# Patient Record
Sex: MALE | Race: WHITE | URBAN - METROPOLITAN AREA
[De-identification: names, ages, dates, MRNs, and addresses within clinical notes are randomized per-mention and may not be internally consistent; named-entity substitution may affect disease eponyms.]

---

## 2021-11-02 ENCOUNTER — OUTPATIENT (OUTPATIENT)
Dept: OUTPATIENT SERVICES | Facility: HOSPITAL | Age: 81
LOS: 1 days | Discharge: HOME | End: 2021-11-02
Payer: MEDICARE

## 2021-11-02 VITALS — WEIGHT: 175.05 LBS | HEIGHT: 67 IN

## 2021-11-02 DIAGNOSIS — Z98.890 OTHER SPECIFIED POSTPROCEDURAL STATES: Chronic | ICD-10-CM

## 2021-11-02 DIAGNOSIS — Z95.0 PRESENCE OF CARDIAC PACEMAKER: Chronic | ICD-10-CM

## 2021-11-02 DIAGNOSIS — I42.9 CARDIOMYOPATHY, UNSPECIFIED: ICD-10-CM

## 2021-11-02 DIAGNOSIS — Z90.49 ACQUIRED ABSENCE OF OTHER SPECIFIED PARTS OF DIGESTIVE TRACT: Chronic | ICD-10-CM

## 2021-11-02 LAB
GLUCOSE BLDC GLUCOMTR-MCNC: 144 MG/DL — HIGH (ref 70–99)
GLUCOSE BLDC GLUCOMTR-MCNC: 99 MG/DL — SIGNIFICANT CHANGE UP (ref 70–99)

## 2021-11-02 RX ORDER — DAPAGLIFLOZIN 10 MG/1
1 TABLET, FILM COATED ORAL
Qty: 0 | Refills: 0 | DISCHARGE

## 2021-11-02 RX ORDER — SODIUM CHLORIDE 9 MG/ML
1000 INJECTION, SOLUTION INTRAVENOUS
Refills: 0 | Status: DISCONTINUED | OUTPATIENT
Start: 2021-11-02 | End: 2021-11-03

## 2021-11-02 RX ORDER — DEXTROSE 50 % IN WATER 50 %
12.5 SYRINGE (ML) INTRAVENOUS ONCE
Refills: 0 | Status: DISCONTINUED | OUTPATIENT
Start: 2021-11-02 | End: 2021-11-03

## 2021-11-02 RX ORDER — DEXTROSE 50 % IN WATER 50 %
25 SYRINGE (ML) INTRAVENOUS ONCE
Refills: 0 | Status: DISCONTINUED | OUTPATIENT
Start: 2021-11-02 | End: 2021-11-03

## 2021-11-02 RX ORDER — GLUCAGON INJECTION, SOLUTION 0.5 MG/.1ML
1 INJECTION, SOLUTION SUBCUTANEOUS ONCE
Refills: 0 | Status: DISCONTINUED | OUTPATIENT
Start: 2021-11-02 | End: 2021-11-03

## 2021-11-02 RX ORDER — CARVEDILOL PHOSPHATE 80 MG/1
12.5 CAPSULE, EXTENDED RELEASE ORAL
Refills: 0 | Status: DISCONTINUED | OUTPATIENT
Start: 2021-11-02 | End: 2021-11-03

## 2021-11-02 RX ORDER — CEFAZOLIN SODIUM 1 G
VIAL (EA) INJECTION
Refills: 0 | Status: COMPLETED | OUTPATIENT
Start: 2021-11-02 | End: 2021-11-03

## 2021-11-02 RX ORDER — CEFAZOLIN SODIUM 1 G
2000 VIAL (EA) INJECTION ONCE
Refills: 0 | Status: COMPLETED | OUTPATIENT
Start: 2021-11-02 | End: 2021-11-02

## 2021-11-02 RX ORDER — DEXTROSE 50 % IN WATER 50 %
15 SYRINGE (ML) INTRAVENOUS ONCE
Refills: 0 | Status: DISCONTINUED | OUTPATIENT
Start: 2021-11-02 | End: 2021-11-03

## 2021-11-02 RX ORDER — SACUBITRIL AND VALSARTAN 24; 26 MG/1; MG/1
1 TABLET, FILM COATED ORAL
Refills: 0 | Status: DISCONTINUED | OUTPATIENT
Start: 2021-11-02 | End: 2021-11-03

## 2021-11-02 RX ORDER — CEFAZOLIN SODIUM 1 G
2000 VIAL (EA) INJECTION EVERY 8 HOURS
Refills: 0 | Status: COMPLETED | OUTPATIENT
Start: 2021-11-02 | End: 2021-11-03

## 2021-11-02 RX ORDER — INSULIN LISPRO 100/ML
VIAL (ML) SUBCUTANEOUS
Refills: 0 | Status: DISCONTINUED | OUTPATIENT
Start: 2021-11-02 | End: 2021-11-03

## 2021-11-02 RX ORDER — SACUBITRIL AND VALSARTAN 24; 26 MG/1; MG/1
1 TABLET, FILM COATED ORAL
Qty: 0 | Refills: 0 | DISCHARGE

## 2021-11-02 RX ORDER — CARVEDILOL PHOSPHATE 80 MG/1
1 CAPSULE, EXTENDED RELEASE ORAL
Qty: 0 | Refills: 0 | DISCHARGE

## 2021-11-02 RX ORDER — TAMSULOSIN HYDROCHLORIDE 0.4 MG/1
1 CAPSULE ORAL
Qty: 0 | Refills: 0 | DISCHARGE

## 2021-11-02 RX ORDER — ETANERCEPT 25 MG
0 VIAL (EA) SUBCUTANEOUS
Qty: 0 | Refills: 0 | DISCHARGE

## 2021-11-02 RX ORDER — APIXABAN 2.5 MG/1
1 TABLET, FILM COATED ORAL
Qty: 0 | Refills: 0 | DISCHARGE

## 2021-11-02 RX ORDER — INFLUENZA VIRUS VACCINE 15; 15; 15; 15 UG/.5ML; UG/.5ML; UG/.5ML; UG/.5ML
0.7 SUSPENSION INTRAMUSCULAR ONCE
Refills: 0 | Status: COMPLETED | OUTPATIENT
Start: 2021-11-02 | End: 2021-11-02

## 2021-11-02 RX ORDER — TAMSULOSIN HYDROCHLORIDE 0.4 MG/1
0.4 CAPSULE ORAL DAILY
Refills: 0 | Status: DISCONTINUED | OUTPATIENT
Start: 2021-11-02 | End: 2021-11-03

## 2021-11-02 RX ORDER — INSULIN LISPRO 100/ML
VIAL (ML) SUBCUTANEOUS ONCE
Refills: 0 | Status: COMPLETED | OUTPATIENT
Start: 2021-11-02 | End: 2021-11-02

## 2021-11-02 RX ADMIN — TAMSULOSIN HYDROCHLORIDE 0.4 MILLIGRAM(S): 0.4 CAPSULE ORAL at 23:01

## 2021-11-02 RX ADMIN — Medication 100 MILLIGRAM(S): at 14:48

## 2021-11-02 RX ADMIN — Medication 100 MILLIGRAM(S): at 23:01

## 2021-11-02 RX ADMIN — CARVEDILOL PHOSPHATE 12.5 MILLIGRAM(S): 80 CAPSULE, EXTENDED RELEASE ORAL at 19:24

## 2021-11-02 RX ADMIN — SACUBITRIL AND VALSARTAN 1 TABLET(S): 24; 26 TABLET, FILM COATED ORAL at 21:42

## 2021-11-02 NOTE — H&P ADULT - NSICDXPASTMEDICALHX_GEN_ALL_CORE_FT
PAST MEDICAL HISTORY:  Diabetes mellitus     History of BPH     History of complete heart block     Rheumatoid arthritis

## 2021-11-02 NOTE — PATIENT PROFILE ADULT - PRO INTERPRETER NEED 2
Health Maintenance Summary     Topic Due On Due Status Completed On    MAMMOGRAM - BREAST CANCER SCREENING Jan 18, 2018 Not Due Jan 18, 2016    Pap Smear - Cervical Cancer Screening  Mar 1, 2021 Not Due Mar 1, 2016    Colorectal Cancer Screening - Colonoscopy Oct 29, 2020 Not Due Oct 29, 2015    Immunization - Td/Tdap Nov 7, 2023 Not Due Nov 7, 2013    Immunization-Zoster  Completed Nov 7, 2013    Immunization - TDAP Pregnancy  Hidden     Medicare Wellness Visit Nov 15, 2017 Hidden     Immunization-Influenza  Completed Oct 4, 2016          Patient is due for topics as listed above, she wishes to decline at this time .       English

## 2021-11-02 NOTE — H&P ADULT - NSICDXPASTSURGICALHX_GEN_ALL_CORE_FT
PAST SURGICAL HISTORY:  Cardiac pacemaker     History of appendectomy     History of knee surgery

## 2021-11-02 NOTE — CHART NOTE - NSCHARTNOTEFT_GEN_A_CORE
PRAMOD Teller  [id number]    PROCEDURE:  implant of an ICD lead  explant of RV pacing lead  re-positioning of RA lead  upgrade to Bi-V ICD      INDICATION:  CRT-P with worsening LV function  AV block        ELECTROPHYSIOLOGIST(S):  Dr. Camilo  Fellow Dr. Egan      ANESTHESIOLOGY:  MAC, local      FINDINGS:  - US guided axillary puncture  - RV ICD implanted in the low RV septum, old RV pacing lead was explanted with no difficulty, more slack added to the RA lead   - leads connected to CRT-D generator and wrapped in Tyrx pouch      COMPLICATIONS:  none      RECOMMENDATIONS:  CXR PA/Lat, 12 lead ECG and device interrogation in AM

## 2021-11-02 NOTE — H&P ADULT - HISTORY OF PRESENT ILLNESS
80 yo M with history of DM, rheumatoid arthritis, CHB, non obstructive CAD, s/p CRT-P (3/29/21)  80 yo M with history of DM, rheumatoid arthritis, CHB, non obstructive CAD, s/p CRT-P (3/29/21) presents for up grade to BiV ICD and RV lead removal.  During his device follow up was noted that his EF reduced  to 35%, despite medical therapy and good  CRT pacing, episodes of NSVT were also noted on device interrogation  patient was offered upgrade to  BIV ICD for primary prevention of SCD.

## 2021-11-02 NOTE — H&P ADULT - ASSESSMENT
80 yo M with history of DM, rheumatoid arthritis, CHB, non obstructive CAD, s/p CRT-P (3/29/21) presents for up grade to BiV ICD and RV lead removal.

## 2021-11-02 NOTE — H&P ADULT - PROBLEM SELECTOR PLAN 1
upgrade to BiV ICD, RV lead revision  hold Eliquis today  bed rest  CXR in am, tylenol PRN   Will continue Entresto /Carvedilol.

## 2021-11-03 ENCOUNTER — TRANSCRIPTION ENCOUNTER (OUTPATIENT)
Age: 81
End: 2021-11-03

## 2021-11-03 ENCOUNTER — RESULT REVIEW (OUTPATIENT)
Age: 81
End: 2021-11-03

## 2021-11-03 VITALS — RESPIRATION RATE: 19 BRPM | DIASTOLIC BLOOD PRESSURE: 67 MMHG | SYSTOLIC BLOOD PRESSURE: 109 MMHG

## 2021-11-03 LAB
A1C WITH ESTIMATED AVERAGE GLUCOSE RESULT: 6.4 % — HIGH (ref 4–5.6)
ESTIMATED AVERAGE GLUCOSE: 137 MG/DL — HIGH (ref 68–114)
GLUCOSE BLDC GLUCOMTR-MCNC: 117 MG/DL — HIGH (ref 70–99)
GLUCOSE BLDC GLUCOMTR-MCNC: 198 MG/DL — HIGH (ref 70–99)

## 2021-11-03 PROCEDURE — 33234 REMOVAL OF PACEMAKER SYSTEM: CPT

## 2021-11-03 PROCEDURE — C1894: CPT

## 2021-11-03 PROCEDURE — C1889: CPT

## 2021-11-03 PROCEDURE — 33249 INSJ/RPLCMT DEFIB W/LEAD(S): CPT

## 2021-11-03 PROCEDURE — 71046 X-RAY EXAM CHEST 2 VIEWS: CPT

## 2021-11-03 PROCEDURE — C1882: CPT

## 2021-11-03 PROCEDURE — C1777: CPT

## 2021-11-03 PROCEDURE — 82962 GLUCOSE BLOOD TEST: CPT

## 2021-11-03 PROCEDURE — 71046 X-RAY EXAM CHEST 2 VIEWS: CPT | Mod: 26

## 2021-11-03 PROCEDURE — 83036 HEMOGLOBIN GLYCOSYLATED A1C: CPT

## 2021-11-03 PROCEDURE — 36415 COLL VENOUS BLD VENIPUNCTURE: CPT

## 2021-11-03 PROCEDURE — 33233 REMOVAL OF PM GENERATOR: CPT

## 2021-11-03 RX ADMIN — SACUBITRIL AND VALSARTAN 1 TABLET(S): 24; 26 TABLET, FILM COATED ORAL at 05:48

## 2021-11-03 RX ADMIN — CARVEDILOL PHOSPHATE 12.5 MILLIGRAM(S): 80 CAPSULE, EXTENDED RELEASE ORAL at 10:46

## 2021-11-03 RX ADMIN — Medication 100 MILLIGRAM(S): at 05:49

## 2021-11-03 NOTE — DISCHARGE NOTE PROVIDER - NSDCCPCAREPLAN_GEN_ALL_CORE_FT
PRINCIPAL DISCHARGE DIAGNOSIS  Diagnosis: Cardiomyopathy, primary  Assessment and Plan of Treatment:       SECONDARY DISCHARGE DIAGNOSES  Diagnosis: History of complete heart block  Assessment and Plan of Treatment:

## 2021-11-03 NOTE — DISCHARGE NOTE NURSING/CASE MANAGEMENT/SOCIAL WORK - NSDCFUADDAPPT_GEN_ALL_CORE_FT
Follow up Dr. Camilo , call his office at [phone] to schedule an appointment or if you have questions.   Avoid heavy lifting, exercise and strenuous activity for 4 weeks. Showering is ok.   Do not raise your left arm over the shoulder.   Resume Eliquis 11/4/ 21 in am.

## 2021-11-03 NOTE — DISCHARGE NOTE NURSING/CASE MANAGEMENT/SOCIAL WORK - PATIENT PORTAL LINK FT
You can access the FollowMyHealth Patient Portal offered by [facility] by registering at the following website: [email or web address]. By joining [facility]’s FollowMyHealth portal, you will also be able to view your health information using other applications (apps) compatible with our system.

## 2021-11-03 NOTE — DISCHARGE NOTE PROVIDER - CARE PROVIDER_API CALL
John Camilo (MD)  Cardiac Electrophysiology; Cardiology; Internal Medicine  [address] [place], [place] [address]  Phone: [phone]  Fax: [phone]  Follow Up Time:

## 2021-11-03 NOTE — DISCHARGE NOTE PROVIDER - NSDCCPTREATMENT_GEN_ALL_CORE_FT
PRINCIPAL PROCEDURE  Procedure: Upgrade, cardiac pacemaker to ICD, with right ventricular electrode lead removal  Findings and Treatment:

## 2021-11-03 NOTE — DISCHARGE NOTE PROVIDER - HOSPITAL COURSE
82 yo M with history of DM, rheumatoid arthritis, CHB, non obstructive CAD, s/p CRT-P (3/29/21) presents for up grade to BiV ICD and RV lead removal.  Patient had successful up grade to BiV ICD and removal of RV pacing lead 0n 11/2/21.  There were no complications, pos implant ICD check , normal function. 80 yo M with history of DM, rheumatoid arthritis, CHB, non obstructive CAD, s/p CRT-P (3/29/21) presents for up grade to BiV ICD and RV lead removal.  Patient had successful up grade to BiV ICD and removal of RV pacing lead 0n 11/2/21.  There were no complications, pos implant ICD check , normal function.   CXR done leads are in good position  Patient was stable for discharge

## 2021-11-03 NOTE — PROGRESS NOTE ADULT - SUBJECTIVE AND OBJECTIVE BOX
EPS Device interrogation    Indication: post implant    Device model: 	MDT Cobalt Quad 		Implanting Physician: Dr. Camilo     Functioning Mode: DDDR 60/130			    Underlying Rhythm: AP/    Pacemaker dependency:  yes     Battery status: 100% (NOHEMY)  Interrogating parameters:   				RA			RV			LV    Sense:                                      0.8   mV                          paced    Threshold:                            0.75 V @ 0.4 ms          0.5 V @ 0.4                   0.5 V @ 0.4 ms    Pacing Impedance:                        399 om                         380 om                   703 om    Shock Impedance:                                                              44   om    Events/Alert:  none    Parameter change: 	none    For ICD only:  tachy therapy – unchanged   Normal function ICD  implant site no bleeding, no swelling, no hematoma  [ ]EPS attending: Interrogation reviewed. Agree with above.

## 2021-11-03 NOTE — DISCHARGE NOTE PROVIDER - NSDCMRMEDTOKEN_GEN_ALL_CORE_FT
carvedilol 12.5 mg oral tablet: 1 tab(s) orally 2 times a day  Eliquis 5 mg oral tablet: 1 tab(s) orally 2 times a day  Enbrel 25 mg/0.5 mL subcutaneous solution: subcutaneous 2 times a week  Entresto 24 mg-26 mg oral tablet: 1 tab(s) orally 2 times a day  Farxiga 5 mg oral tablet: 1 tab(s) orally once a day  tamsulosin 0.4 mg oral capsule: 1 cap(s) orally once a day

## 2021-11-03 NOTE — DISCHARGE NOTE PROVIDER - NSDCFUADDAPPT_GEN_ALL_CORE_FT
Follow up Dr. Camilo , call his office at [phone] to schedule an appointment or if you have questions.   Avoid heavy lifting, exercise and strenuous activity for 4 weeks. Showering is ok.   Do not raise your left arm over the shoulder.   Resume Eliquis 11/4/ 21 in am.

## 2021-11-04 PROBLEM — Z00.00 ENCOUNTER FOR PREVENTIVE HEALTH EXAMINATION: Status: ACTIVE | Noted: 2021-11-04

## 2021-12-13 PROBLEM — Z87.438 PERSONAL HISTORY OF OTHER DISEASES OF MALE GENITAL ORGANS: Chronic | Status: ACTIVE | Noted: 2021-11-02

## 2021-12-13 PROBLEM — Z86.79 PERSONAL HISTORY OF OTHER DISEASES OF THE CIRCULATORY SYSTEM: Chronic | Status: ACTIVE | Noted: 2021-11-02

## 2021-12-13 PROBLEM — E11.9 TYPE 2 DIABETES MELLITUS WITHOUT COMPLICATIONS: Chronic | Status: ACTIVE | Noted: 2021-11-02

## 2021-12-13 PROBLEM — M06.9 RHEUMATOID ARTHRITIS, UNSPECIFIED: Chronic | Status: ACTIVE | Noted: 2021-11-02

## 2021-12-23 ENCOUNTER — APPOINTMENT (OUTPATIENT)
Dept: HEART AND VASCULAR | Facility: CLINIC | Age: 81
End: 2021-12-23
Payer: MEDICARE

## 2021-12-23 ENCOUNTER — APPOINTMENT (OUTPATIENT)
Dept: CARDIOLOGY | Facility: CLINIC | Age: 81
End: 2021-12-23
Payer: MEDICARE

## 2021-12-23 ENCOUNTER — NON-APPOINTMENT (OUTPATIENT)
Age: 81
End: 2021-12-23

## 2021-12-23 VITALS
HEIGHT: 67 IN | OXYGEN SATURATION: 97 % | DIASTOLIC BLOOD PRESSURE: 67 MMHG | SYSTOLIC BLOOD PRESSURE: 116 MMHG | BODY MASS INDEX: 28.16 KG/M2 | WEIGHT: 179.38 LBS | TEMPERATURE: 97.7 F | HEART RATE: 87 BPM

## 2021-12-23 DIAGNOSIS — Z87.891 PERSONAL HISTORY OF NICOTINE DEPENDENCE: ICD-10-CM

## 2021-12-23 DIAGNOSIS — Z78.9 OTHER SPECIFIED HEALTH STATUS: ICD-10-CM

## 2021-12-23 PROCEDURE — 93000 ELECTROCARDIOGRAM COMPLETE: CPT

## 2021-12-23 PROCEDURE — 99205 OFFICE O/P NEW HI 60 MIN: CPT | Mod: 25

## 2021-12-23 PROCEDURE — 99215 OFFICE O/P EST HI 40 MIN: CPT

## 2021-12-23 PROCEDURE — 93306 TTE W/DOPPLER COMPLETE: CPT | Mod: 59

## 2021-12-23 RX ORDER — TAMSULOSIN HYDROCHLORIDE 0.4 MG/1
0.4 CAPSULE ORAL
Qty: 90 | Refills: 3 | Status: ACTIVE | COMMUNITY

## 2021-12-23 NOTE — HISTORY OF PRESENT ILLNESS
[FreeTextEntry1] : Mr. Broussadr presents for initial evaluation and management of chronic systolic heart failure secondary to nonischemic cardiomyopathy, nonobstructive CAD, complete heart block with urgent PPM implantation (03/29/21), s/p upgrade to CRT-D (11/02/21), paroxysmal atrial fibrillation, aTTR-cardiac amyloidosis, DM2, and "rheumatoid arthritis".  He is followed by a cardiologist at [facility], Edison Edmonds MD in [place].  He is referred for the management of cardiac amyloidosis.  He is being treated for chronic systolic heart failure secondary to nonischemic cardiomyopathy.  Based on an echocardiogram, his cardiologist sent him for a cardiac MR to rule out an infiltrative cardiomyopathy.  The cardiac MR was consistent with an infiltrative cardiomyopathy (likely amyloidosis).  He had a normal SPEP and UPEP (HARPREET not performed) and was then sent of a TC-99m PYP scan which was markedly positive for ATTR-cardiac amyloidosis.  In March, 2021, while on vacation in [place], [place], he had progression of cardiac conduction disease and underwent urgent implantation of a dual chamber pacemaker with Jose Alejandro Alvarado MD.  His device interrogations revealed paroxysmal atrial fibrillation and ventricular ectopy.  On 11/02/21, he underwent upgrade to a CRT-D device with John Camilo MD of [facility] .  We had an extensive discussion about the pathophysiology, natural progression, and treatment of ATTR-cardiac amyloidosis.  At present, he has complaints of LEON to 2 flights of stairs.  He denies chest pain, palpitations, or lower extremity syncope.  In addition, he has complaints of polyarthralgia and was diagnosed with rheumatoid arthritis but we discussed the possibility of amyloid synovial arthropathy.  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  Today, 12/23/21, he had an echocardiogram which revealed an EF of 33%, reduced LV GLS -14.2% (a/b+m 1.7), mild symmetrically increased LV wall thickness, grade II diastolic dysfunction, mildly reduced RV function, mildly dilated LA, aortic sclerosis, mild AI, mild TR, and CRT leads.  \par

## 2021-12-23 NOTE — REVIEW OF SYSTEMS
[Dyspnea on exertion] : dyspnea during exertion [Joint Pain] : joint pain [Tingling (Paresthesia)] : tingling [Negative] : Heme/Lymph

## 2021-12-23 NOTE — ASSESSMENT
[FreeTextEntry1] : 1. Complete heart block: likely secondary to cardiac amyloidosis: s/p urgent PPM implantation (03/29/21), s/p upgrade to CRT-D (11/02/21):\par      - follow up with heart rhythm specialist, John Camilo MD and device clinic at [facility] in [place]\par \par 2. Paroxysmal atrial fibrillation: likely secondary to cardiac amyloidosis:\par      - continue systemic anticoagulation with Eliquis 5mg po bid\par      - discussed with patient avoidance of ASA and NSAIDS as well as need for bleeding surveillance \par      - patient will provide copy of recent lab work from PMD's office for my review \par \par 3. Chronic systolic heart failure, secondary to nonischemic cardiomyopathy, cardiac amyloidosis: NYHA II\par      - continue Entresto 24/26mg po bid (up titrate as SBP allows)\par      - continue carvedilol 12.5mg po bid (up titrate as SBP allows) \par      - continue eplerenone 25mg po qd (had mastalgia on spironolactone)\par      - continue SGLT2 inhibition with Farxiga 5mg po qd (discussed proper urinary hygiene)\par      - continue furosemide 40mg po qd prn edema \par      - will try to obtain recent echocardiogram report\par      - will send for an echocardiogram with LV GLS\par      - standard HFrEF therapy often not well tolerated with cardiac amyloidosis but he is doing well\par \par 4. CAD: nonobstructive: CCS 0\par      - will try to obtain prior invasive coronary angiogram report\par      - continue off antiplatelet agent given need for systemic anticoagulation\par  \par 5. Cardiac amyloidosis: ATTR\par     - follow up results of genetic work up performed today\par     - will initiate a tetramer stabilizing agent, Vyndamax 61mg po qd  (I reviewed any possible related COI) \par     - will send for an echocardiogram to assess LV EF and global longitudinal strain\par     - will try to obtain reports from work up (cardiac MR, echo, PYP scan, lab work)\par     - will consider adding other agents when approved for cardiac amyloidosis\par \par 6. Polyneuropathy: hands:\par     - will send to a neurologist, Nathen Mehta MD to screen for amyloid polyneuropathy\par \par \par \par An ECG was obtained to evaluate heart rhythm and screen for any underlying cardiac abnormalities. \par \par \par

## 2021-12-27 ENCOUNTER — NON-APPOINTMENT (OUTPATIENT)
Age: 81
End: 2021-12-27

## 2022-01-18 LAB — COMBINED CARDIAC PANEL: ABNORMAL

## 2022-02-21 PROBLEM — I00 RHEUMATIC ARTERITIS: Status: ACTIVE | Noted: 2021-12-23

## 2022-02-24 ENCOUNTER — APPOINTMENT (OUTPATIENT)
Dept: HEART AND VASCULAR | Facility: CLINIC | Age: 82
End: 2022-02-24
Payer: MEDICARE

## 2022-02-24 VITALS — BODY MASS INDEX: 27.94 KG/M2 | HEIGHT: 67 IN | WEIGHT: 178 LBS

## 2022-02-24 DIAGNOSIS — I00 RHEUMATIC FEVER W/OUT HEART INVOLVEMENT: ICD-10-CM

## 2022-02-24 PROCEDURE — 99214 OFFICE O/P EST MOD 30 MIN: CPT | Mod: 95

## 2022-02-24 NOTE — ASSESSMENT
[FreeTextEntry1] : 1. Complete heart block: likely secondary to cardiac amyloidosis: s/p urgent PPM implantation (03/29/21), s/p upgrade to CRT-D (11/02/21):\par      - follow up with heart rhythm specialist, John Camilo MD and device clinic at [facility] in [place]\par \par 2. Paroxysmal atrial fibrillation: likely secondary to cardiac amyloidosis:\par      - continue systemic anticoagulation with Eliquis 5mg po bid\par      - discussed with patient avoidance of ASA and NSAIDS as well as need for bleeding surveillance \par \par 3. Chronic systolic heart failure, secondary to nonischemic cardiomyopathy, cardiac amyloidosis: NYHA II\par      - continue Entresto 24/26mg po bid (up titrate as SBP allows)\par      - continue carvedilol 12.5mg po bid (up titrate as SBP allows) \par      - continue eplerenone 25mg po qd (had mastalgia on spironolactone)\par      - continue SGLT2 inhibition with Farxiga 5mg po qd (discussed proper urinary hygiene)\par      - continue furosemide 40mg po qd prn edema \par      - standard HFrEF therapy often not well tolerated with cardiac amyloidosis but he is doing well\par \par 4. CAD: nonobstructive: CCS 0\par      - will try to obtain prior invasive coronary angiogram report\par      - continue off antiplatelet agent given need for systemic anticoagulation\par  \par 5. Cardiac amyloidosis: ATTR-CM\par     - follow up results of genetic work up (still pending)\par     - continue tetramer stabilizing agent, Vyndamax 61mg po qd   \par     - will consider adding other agents when approved for cardiac amyloidosis or if diagnosed with amyloid neuropathy\par \par 6. Polyneuropathy: hands:\par     - will send to a neurologist, Nathen Mehta MD to screen for amyloid polyneuropathy (has an appointment in May, 2022) \par     - he will provide a copy of the EMG report performed by his primary neurologist \par \par This encounter was performed as a telehealth encounter employing the [employer], [employer], or other approved audio/video platform.  All components of the evaluation and management were performed per clinical routine with the exception of the physical exam.  The physical exam references my most recent physical exam plus any additional information provided by the patient (i.e. ambulatory vitals/weight) or inspection from the video portion of the encounter. \par \par I spent in excess of 40 minutes on the encounter.  \par \par Verbal consent was given on 02/24/22 by Adilson Broussard.\par \par Patient location: The patient was located at the primary address listed in the medical record.\par Physician location: I was located in my office in [facility]. \par \par \par \par \par \par

## 2022-02-24 NOTE — REASON FOR VISIT
[FreeTextEntry1] : Diagnostic Tests:\par ------------------------------------------\par ECG:\par 12/23/21: A-paced, Bi-V paced. \par ------------------------------------------\par Echo:\par 12/23/21: EF 33%, reduced LV GLS -14.2% (a/b+m 1.7), mild symmetrically increased LV wall thickness, grade II diastolic dysfunction, mildly reduced RV function, mildly dilated LA, aortic sclerosis, mild AI, mild TR, CRT leads.  \par 08/03/21: EF 35%, moderately increased LV wall thickness (1.7/1.6), grade II diastolic dysfunction, LV GLS -11% with relative apical sparring, mildly dilated RV, reduced RV function, increase RV wall thickness, markedly dilated LA, moderately dilated RA, mild MR, aortic root 3.7 cm, device leads.  \par ------------------------------------------\par EP procedures:\par 11/02/21: upgrade to CRT-D. \par 03/29/21: CRT-P placement: [employer]. \par ------------------------------------------\par Nuclear Med:\par 12/07/21: TC-99 PYP scan: grade 3/3 myocardial uptake.

## 2022-02-24 NOTE — HISTORY OF PRESENT ILLNESS
[FreeTextEntry1] : Mr. Broussard presents for follow up and management of chronic systolic heart failure secondary to nonischemic cardiomyopathy, nonobstructive CAD, complete heart block with urgent PPM implantation (03/29/21), s/p upgrade to CRT-D (11/02/21), paroxysmal atrial fibrillation, aTTR-cardiac amyloidosis, DM2, and "rheumatoid arthritis".  He is followed by a cardiologist at [facility], Edison Edmonds MD in [place].  He is referred for the management of cardiac amyloidosis.  He is being treated for chronic systolic heart failure secondary to nonischemic cardiomyopathy.  Based on an echocardiogram, his cardiologist sent him for a cardiac MR to rule out an infiltrative cardiomyopathy.  The cardiac MR was consistent with an infiltrative cardiomyopathy (likely amyloidosis).  He had a normal SPEP and UPEP (HARPREET not performed) and was then sent of a TC-99m PYP scan which was markedly positive for ATTR-cardiac amyloidosis.  In March, 2021, while on vacation in [place], [place], he had progression of cardiac conduction disease and underwent urgent implantation of a dual chamber pacemaker with Jose Alejandro Alvarado MD.  His device interrogations revealed paroxysmal atrial fibrillation and ventricular ectopy.  On 11/02/21, he underwent upgrade to a CRT-D device with John Camilo MD of [facility] .  We had an extensive discussion about the pathophysiology, natural progression, and treatment of ATTR-cardiac amyloidosis.  At present, he has complaints of LEON to 2 flights of stairs.  He denies chest pain, palpitations, or lower extremity syncope.  In addition, he has complaints of polyarthralgia and was diagnosed with rheumatoid arthritis but we discussed the possibility of amyloid synovial arthropathy.  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  On 12/23/21, he had an echocardiogram which revealed an EF of 33%, reduced LV GLS -14.2% (a/b+m 1.7), mild symmetrically increased LV wall thickness, grade II diastolic dysfunction, mildly reduced RV function, mildly dilated LA, aortic sclerosis, mild AI, mild TR, and CRT leads.  At present, he feels generally well. \par

## 2022-05-05 ENCOUNTER — APPOINTMENT (OUTPATIENT)
Dept: CARDIOLOGY | Facility: CLINIC | Age: 82
End: 2022-05-05
Payer: MEDICARE

## 2022-05-05 ENCOUNTER — NON-APPOINTMENT (OUTPATIENT)
Age: 82
End: 2022-05-05

## 2022-05-05 VITALS
DIASTOLIC BLOOD PRESSURE: 74 MMHG | SYSTOLIC BLOOD PRESSURE: 120 MMHG | WEIGHT: 184 LBS | TEMPERATURE: 97.3 F | BODY MASS INDEX: 28.88 KG/M2 | OXYGEN SATURATION: 97 % | HEIGHT: 67 IN | HEART RATE: 85 BPM

## 2022-05-05 PROCEDURE — 99215 OFFICE O/P EST HI 40 MIN: CPT

## 2022-05-05 PROCEDURE — 93000 ELECTROCARDIOGRAM COMPLETE: CPT

## 2022-05-05 NOTE — PHYSICAL EXAM
[Extraocular Movements Intact] : extraocular movements were intact [Normal] : no rash, no stigmata of neurocutaneous disease [Muscle tone/ strength] : muscle tone/ strength is normal [Pectus Deformity] : no pectus deformity [Tremor] : no tremor [de-identified] : 5/5 UE and LE, negative tinnels and sign

## 2022-05-05 NOTE — REVIEW OF SYSTEMS
[Fatigue] : fatigue [Palpitations] : palpitations [Arrhythmia] : arrhythmia [Negative] : Musculoskeletal [Generalized pain] : pain not generalized [Vision loss] : no vision loss [FreeTextEntry7] : early satiety

## 2022-05-05 NOTE — PHYSICAL EXAM
[Extraocular Movements Intact] : extraocular movements were intact [Normal] : no rash, no stigmata of neurocutaneous disease [Muscle tone/ strength] : muscle tone/ strength is normal [Pectus Deformity] : no pectus deformity [Tremor] : no tremor [de-identified] : 5/5 UE and LE, negative tinnels and sign

## 2022-05-05 NOTE — FAMILY HISTORY
[FreeTextEntry1] : FamilyHistory_20_twCiteListControlStart FamilyHistory_20_twCiteListControlEnd [id number] \par A four-generation family history was constructed and scanned into [employer]. \par Family history is significant for: \par siblings\par sister 76 yo  healthy- no children\par sister 72 yo healthy- son 41 yo (son 7yo) , daughter 34 yo healthy \par \par Mother dec br CA mets to lung 71 yo\par Maternal aunts x3 all dec, cousin dec 80 yo hairy cell leukemia  (2sons and 1 daughter in 40s)\par Maternal uncles x1 all dec\par med hx unk\par Maternal Grandmother dec mid 80s\par Maternal Grandfather dec CVA mid 70s, IDDM \par \par Father dec 98 old age, slow heart beat treated with medication (for 20 years), hx polio\par 13 sibling, 11 to adulthood\par one still born and one infant death both M\par Paternal aunts emaining F, all but one aunt dec , \par living aunt 90s\par Paternal uncles x3\par dec 104 yo\par \par Paternal Grandfather dec 99 yo (cousins)\par Paternal Grandmother dec 87 yo\par *was told all of his fathers family had "slow hearts"\par \par children:\par 3 sons\par son 46 yo healthy-son 19 yo , son 15 yo, daughter 11 yo\par son 44 yo  healthy- son 12 yo , son 12 yo healthy\par son 43 yo healthy- no children\par \par his maternal families originate from [facility] and paternal families originate from [place] (Mennonites) \par No Ashkenazi Adventist ancestry. \par Family history was positive Paternal grandparents cousins consanguinity  \par No family history of SIDS\par  \par \par  [FreeTextEntry2] : [place]ip [place] , [place]  [FreeTextEntry3] : [place], [place] ([facility])

## 2022-05-05 NOTE — HISTORY OF PRESENT ILLNESS
[FreeTextEntry1] : PRAMOD YI is a 82 yo M PMH DM, rheumatoid arthritis, CHB, non obstructive CAD, s/p\par ppm (3/29/21) recent upgrade to BiV ICD and RV lead removal., afib event on eliquis (id on his PPM) \par with reduction in his EF despite GDMT\par \par he was first diagnosed with CM 5-6 years ago during medical clearance for knee surgery , \par he was found to have thickened wall of his LV at the time \par \par he states a diagnosis of cardiac amyloid, Dr Edmonds sent him fo rcMRI , \par and another scan (will obtain records) \par \par + numbness fingers began 6 m ago- happens every night bl hands Rt >LT\par reduced appetite, early satiety\par hx cataract\par \par since his last visit he had a Tc PYR scan that was + for TTR CA\par \par he underwent genetic testing and today presents for results\par \par

## 2022-05-05 NOTE — FAMILY HISTORY
[FreeTextEntry1] : FamilyHistory_20_twCiteListControlStart FamilyHistory_20_twCiteListControlEnd [id number] \par A four-generation family history was constructed and scanned into [employer]. \par Family history is significant for: \par siblings\par sister 76 yo  healthy- no children\par sister 72 yo healthy- son 39 yo (son 9yo) , daughter 34 yo healthy \par \par Mother dec br CA mets to lung 73 yo\par Maternal aunts x3 all dec, cousin dec 80 yo hairy cell leukemia  (2sons and 1 daughter in 40s)\par Maternal uncles x1 all dec\par med hx unk\par Maternal Grandmother dec mid 80s\par Maternal Grandfather dec CVA mid 70s, IDDM \par \par Father dec 98 old age, slow heart beat treated with medication (for 20 years), hx polio\par 13 sibling, 11 to adulthood\par one still born and one infant death both M\par Paternal aunts emaining F, all but one aunt dec , \par living aunt 90s\par Paternal uncles x3\par dec 104 yo\par \par Paternal Grandfather dec 97 yo (cousins)\par Paternal Grandmother dec 87 yo\par *was told all of his fathers family had "slow hearts"\par \par children:\par 3 sons\par son 46 yo healthy-son 17 yo , son 15 yo, daughter 13 yo\par son 44 yo  healthy- son 12 yo , son 14 yo healthy\par son 45 yo healthy- no children\par \par his maternal families originate from [facility] and paternal families originate from [place], [place] (Mennonites) \par No Ashkenazi Mosque ancestry. \par Family history was positive Paternal grandparents cousins consanguinity  \par No family history of SIDS\par  \par \par  [FreeTextEntry2] : Sandip Malone , [place]  [FreeTextEntry3] : [place], [place] ([facility])

## 2022-05-05 NOTE — REASON FOR VISIT
[FreeTextEntry3] : Dear Dr. Mcdowell     . I saw your patient PRAMOD YI on 12/23/2021 . Please see the note below for the assessment and plan. \par \par PRAMOD YI  is being seen  for an initial consultation at the [facility] at [facility] on 12/23/2021.   Mr. YI was referred by  Dr mcdowell for hereditary cardiac predisposition risk assessment and counseling, due to NICM possible amyloid \par

## 2022-05-05 NOTE — REASON FOR VISIT
[FreeTextEntry3] : Dear Dr. Mcdowell     . I saw your patient PRAMOD YI on 12/23/2021 . Please see the note below for the assessment and plan. \par \par PRAMOD YI  is being seen  for an initial consultation at the Cardi[facility] Program at [facility] on 12/23/2021.   Mr. YI was referred by  Dr mcdowell for hereditary cardiac predisposition risk assessment and counseling, due to NICM possible amyloid \par

## 2022-05-05 NOTE — HISTORY OF PRESENT ILLNESS
[FreeTextEntry1] : PRAMOD YI is a 82 yo M PMH DM, rheumatoid arthritis, CHB, non obstructive CAD, s/p\par ppm (3/29/21) recent upgrade to BiV ICD and RV lead removal., afib event on eliquis (id on his PPM) \par with reduction in his EF despite GDMT\par \par he was first diagnosed with CM 5-6 years ago during medical clearance for knee surgery , \par he was found to have thickened wall of his LV at the time \par \par he states a diagnosis of cardiac amyloid, Dr Edmonds sent him fo rcMRI , \par and another scan (will obtain records) \par \par + numbness fingers began 6 m ago- happens every night bl hands Rt >LT\par reduced appetite, early satiety\par hx cataract\par \par \par today he is referred for a cardiogenomic evaluation \par

## 2022-06-06 ENCOUNTER — APPOINTMENT (OUTPATIENT)
Dept: NEUROLOGY | Facility: CLINIC | Age: 82
End: 2022-06-06
Payer: MEDICARE

## 2022-06-06 VITALS
TEMPERATURE: 98.2 F | DIASTOLIC BLOOD PRESSURE: 70 MMHG | HEIGHT: 67 IN | OXYGEN SATURATION: 97 % | BODY MASS INDEX: 27.78 KG/M2 | WEIGHT: 177 LBS | SYSTOLIC BLOOD PRESSURE: 125 MMHG | HEART RATE: 85 BPM

## 2022-06-06 DIAGNOSIS — G56.03 CARPAL TUNNEL SYNDROM,BILATERAL UPPER LIMBS: ICD-10-CM

## 2022-06-06 DIAGNOSIS — G62.9 POLYNEUROPATHY, UNSPECIFIED: ICD-10-CM

## 2022-06-06 PROCEDURE — 99204 OFFICE O/P NEW MOD 45 MIN: CPT

## 2022-06-06 NOTE — CONSULT LETTER
[Dear  ___] : Dear  [unfilled], [Consult Letter:] : I had the pleasure of evaluating your patient, [unfilled]. [Please see my note below.] : Please see my note below. [Consult Closing:] : Thank you very much for allowing me to participate in the care of this patient.  If you have any questions, please do not hesitate to contact me. [Sincerely,] : Sincerely, [FreeTextEntry3] : Nathen Mehta M.D.\par Neurology, Electromyography and Neuromuscular Medicine\par [facility]\par \par  of Neurology\par [facility] [employer]

## 2022-06-06 NOTE — HISTORY OF PRESENT ILLNESS
[FreeTextEntry1] : Referred by Dr. Salmeron for numbness and tingling in his hands and feet\par Symptoms started 2 years ago, occurring every night at rest, worse in the feet than the hands \par He had genetic testing for TTR mutations which was negative\par He was diagnosed with wild type cardiac amyloidosis and started on vyndamax about 6 months ago with improvement in the paresthesias \par He denies burning or electrical shooting pain \par He has a history of diabetes mellitus, last A1C in the low 7% range\par \par Reviewed:\par Labs - TSH normal, ESR 44\par Cardiac hereditary mutation panel unremarkable (2 VUS); no mutation in TTR \par NCS/EMG - mild sensorimotor polyneuropathy, moderate b/l median nerve entrapments at the wrists

## 2022-06-06 NOTE — PHYSICAL EXAM
[FreeTextEntry1] : Motor: 5/5 strength throughout \par Sensory: vibration moderately reduced at toes, mildly at ankles b/l\par Reflexes: 2+ UE, 1+ patellar and achilles b/l\par Gait: normal, can walk on heels and toes, can tandem

## 2022-06-06 NOTE — ASSESSMENT
[FreeTextEntry1] : He has evidence of mild polyneuropathy on exam as well as on NCS/EMG, and bilateral carpal tunnel syndrome, although that is not significantly symptomatic\par \par Symptoms are mild and have improved since starting vyndamax. The neuropathy may be due either to diabetes mellitus or wild-type amyloidosis, although this is much less common than cardiac manifestations of wild type TTR disease.And finally, TTR silencer therapy is approved only for hereditary TTR amyloidosis at this time. Given all of these factors, I would not recommend treatment with silencer therapy at this time, and the patient and his wife are in full agreement. \par \par I would recommend increased glycemic control (goal A1C <7.0%) and to start alpha lipoic acid 600mg daily as that has shown promise in improving the symptoms of diabetic polyneuropathy. \par \par He will follow up with his local neurologist and if there is any worsening of symptoms, return here for re-evaluation

## 2022-06-08 ENCOUNTER — APPOINTMENT (OUTPATIENT)
Dept: HEART AND VASCULAR | Facility: CLINIC | Age: 82
End: 2022-06-08
Payer: MEDICARE

## 2022-06-08 VITALS
HEIGHT: 67 IN | WEIGHT: 189 LBS | HEART RATE: 80 BPM | BODY MASS INDEX: 29.66 KG/M2 | TEMPERATURE: 97.1 F | SYSTOLIC BLOOD PRESSURE: 109 MMHG | OXYGEN SATURATION: 97 % | DIASTOLIC BLOOD PRESSURE: 59 MMHG

## 2022-06-08 PROCEDURE — 99214 OFFICE O/P EST MOD 30 MIN: CPT

## 2022-06-08 RX ORDER — ETANERCEPT 25 MG/.5ML
25 SOLUTION SUBCUTANEOUS
Refills: 0 | Status: ACTIVE | COMMUNITY

## 2022-06-08 NOTE — HISTORY OF PRESENT ILLNESS
[FreeTextEntry1] : Mr. Broussard presents for follow up and management of chronic systolic heart failure secondary to nonischemic cardiomyopathy, nonobstructive CAD, complete heart block with urgent PPM implantation (03/29/21), s/p upgrade to CRT-D (11/02/21), paroxysmal atrial fibrillation, cardiac amyloidosis (aTTR-CM-wt), DM2, and "rheumatoid arthritis".  He is referred for the management of cardiac amyloidosis.  He is being treated for chronic systolic heart failure secondary to nonischemic cardiomyopathy.  Based on an echocardiogram, his cardiologist sent him for a cardiac MR to rule out an infiltrative cardiomyopathy.  The cardiac MR was consistent with an infiltrative cardiomyopathy (likely amyloidosis).  He had a normal SPEP and UPEP (HARPREET not performed) and was then sent for a TC-99m PYP scan which was markedly positive for ATTR-cardiac amyloidosis.  In March, 2021, while on vacation in [place], [place], he had progression of cardiac conduction disease and underwent urgent implantation of a dual chamber pacemaker with Jose Alejandro Alvarado MD.  His device interrogations revealed paroxysmal atrial fibrillation and ventricular ectopy.  On 11/02/21, he underwent upgrade to a CRT-D device with John Camilo MD of [facility].  We had an extensive discussion about the pathophysiology, natural progression, and treatment of ATTR-cardiac amyloidosis.  At present, he has complaints of LEON to 2 flights of stairs.  He denies chest pain, palpitations, or lower extremity syncope.  In addition, he has complaints of polyarthralgia and was diagnosed with rheumatoid arthritis but we discussed the possibility of amyloid synovial arthropathy.  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  On 12/23/21, he had an echocardiogram which revealed an EF of 33%, reduced LV GLS -14.2% (a/b+m 1.7), mild symmetrically increased LV wall thickness, grade II diastolic dysfunction, mildly reduced RV function, mildly dilated LA, aortic sclerosis, mild AI, mild TR, and CRT leads.  His cardiologist at [facility] (Edison Edmonds MD) is retiring and he requested a cardiologist within the [facility] system so I referred him to Vik Rojas MD.  \par

## 2022-06-08 NOTE — ASSESSMENT
[FreeTextEntry1] : 1. Complete heart block: likely secondary to cardiac amyloidosis: s/p urgent PPM implantation (03/29/21), s/p upgrade to CRT-D (11/02/21):\par      - follow up with heart rhythm specialist, John Camilo MD and device clinic at [facility] in [place]\par \par 2. Paroxysmal atrial fibrillation: likely secondary to cardiac amyloidosis:\par      - continue systemic anticoagulation with Eliquis 5mg po bid\par      - discussed with patient avoidance of ASA and NSAIDS as well as need for bleeding surveillance \par      - check lab work today \par \par 3. Chronic systolic heart failure, secondary to nonischemic cardiomyopathy, cardiac amyloidosis: NYHA II\par      - continue Entresto 24/26mg po bid (up titrate as SBP allows)\par      - continue carvedilol 12.5mg po bid (up titrate as SBP allows) \par      - continue eplerenone 25mg po qd (had mastalgia on spironolactone)\par      - continue SGLT2 inhibition with Farxiga 5mg po qd (discussed proper urinary hygiene)\par      - continue furosemide 40mg po qd prn edema \par      - standard HFrEF therapy often not well tolerated with cardiac amyloidosis but he is doing well\par \par 4. CAD: nonobstructive: CCS 0\par      - will try to obtain prior invasive coronary angiogram report\par      - continue off antiplatelet agent given need for systemic anticoagulation\par  \par 5. Cardiac amyloidosis: ATTR-CM-wt, 2 genetic variants of undetermined significance (1 autosomal recessive HFE, 1 autosomal dominant ANK2): \par     - follow up with cardiogenomics team to discuss 2 variants of undetermined significance\par     - continue tetramer stabilizing agent, Vyndamax 61mg po qd   \par     - will consider adding other agents when approved for cardiac amyloidosis or if diagnosed with amyloid neuropathy\par     - check lab work today \par     - will follow with serial echocardiograms\par \par Disease Progression Parameters:\par Date……….Prealbumin……….Troponin-I……….NT-proBNP……….LV GLS TTE………. \par 12/23/21............................................................................................-14.2%....................\par \par \par 6. Polyneuropathy: hands: no indication for TTR silencer (patisiran or inotersen) at this time given wild type\par     - follow up with neurologist, Nathen Mehta MD\par \par

## 2022-06-08 NOTE — REASON FOR VISIT
[Other: ____] : [unfilled] [FreeTextEntry1] : Diagnostic Tests:\par ------------------------------------------\par ECG:\par 05/05/22: A-paced, Bi-V paced. \par 12/23/21: A-paced, Bi-V paced. \par ------------------------------------------\par Echo:\par 12/23/21: EF 33%, reduced LV GLS -14.2% (a/b+m 1.7), mild symmetrically increased LV wall thickness, grade II diastolic dysfunction, mildly reduced RV function, mildly dilated LA, aortic sclerosis, mild AI, mild TR, CRT leads.  \par 08/03/21: EF 35%, moderately increased LV wall thickness (1.7/1.6), grade II diastolic dysfunction, LV GLS -11% with relative apical sparring, mildly dilated RV, reduced RV function, increase RV wall thickness, markedly dilated LA, moderately dilated RA, mild MR, aortic root 3.7 cm, device leads.  \par ------------------------------------------\par EP procedures:\par 11/02/21: upgrade to CRT-D. \par 03/29/21: CRT-P placement: Medtronic. \par ------------------------------------------\par Nuclear Med:\par 12/07/21: TC-99 PYP scan: grade 3/3 myocardial uptake.

## 2022-06-09 ENCOUNTER — NON-APPOINTMENT (OUTPATIENT)
Age: 82
End: 2022-06-09

## 2022-06-09 LAB
ALBUMIN SERPL ELPH-MCNC: 4.7 G/DL
ALP BLD-CCNC: 71 U/L
ALT SERPL-CCNC: 14 U/L
ANION GAP SERPL CALC-SCNC: 16 MMOL/L
AST SERPL-CCNC: 23 U/L
BASOPHILS # BLD AUTO: 0.06 K/UL
BASOPHILS NFR BLD AUTO: 1 %
BILIRUB SERPL-MCNC: 0.9 MG/DL
BUN SERPL-MCNC: 61 MG/DL
CALCIUM SERPL-MCNC: 10 MG/DL
CHLORIDE SERPL-SCNC: 102 MMOL/L
CHOLEST SERPL-MCNC: 113 MG/DL
CO2 SERPL-SCNC: 22 MMOL/L
CREAT SERPL-MCNC: 1.65 MG/DL
EGFR: 42 ML/MIN/1.73M2
EOSINOPHIL # BLD AUTO: 0.28 K/UL
EOSINOPHIL NFR BLD AUTO: 4.6 %
ESTIMATED AVERAGE GLUCOSE: 134 MG/DL
GLUCOSE SERPL-MCNC: 125 MG/DL
HBA1C MFR BLD HPLC: 6.3 %
HCT VFR BLD CALC: 42.7 %
HDLC SERPL-MCNC: 65 MG/DL
HGB BLD-MCNC: 14.4 G/DL
IMM GRANULOCYTES NFR BLD AUTO: 0.2 %
LDLC SERPL CALC-MCNC: 38 MG/DL
LDLC SERPL DIRECT ASSAY-MCNC: 37 MG/DL
LYMPHOCYTES # BLD AUTO: 1.58 K/UL
LYMPHOCYTES NFR BLD AUTO: 26 %
MAN DIFF?: NORMAL
MCHC RBC-ENTMCNC: 33.7 GM/DL
MCHC RBC-ENTMCNC: 35.2 PG
MCV RBC AUTO: 104.4 FL
MONOCYTES # BLD AUTO: 0.66 K/UL
MONOCYTES NFR BLD AUTO: 10.9 %
NEUTROPHILS # BLD AUTO: 3.48 K/UL
NEUTROPHILS NFR BLD AUTO: 57.3 %
NONHDLC SERPL-MCNC: 48 MG/DL
NT-PROBNP SERPL-MCNC: 2856 PG/ML
PLATELET # BLD AUTO: 150 K/UL
POTASSIUM SERPL-SCNC: 5.1 MMOL/L
PREALB SERPL NEPH-MCNC: 25 MG/DL
PROT SERPL-MCNC: 7.8 G/DL
RBC # BLD: 4.09 M/UL
RBC # FLD: 13.2 %
SODIUM SERPL-SCNC: 140 MMOL/L
TRIGL SERPL-MCNC: 49 MG/DL
TSH SERPL-ACNC: 2.25 UIU/ML
WBC # FLD AUTO: 6.07 K/UL

## 2022-06-10 ENCOUNTER — RESULT REVIEW (OUTPATIENT)
Age: 82
End: 2022-06-10

## 2022-06-10 ENCOUNTER — APPOINTMENT (OUTPATIENT)
Dept: NEPHROLOGY | Facility: CLINIC | Age: 82
End: 2022-06-10
Payer: MEDICARE

## 2022-06-10 VITALS
WEIGHT: 182.38 LBS | BODY MASS INDEX: 28.63 KG/M2 | SYSTOLIC BLOOD PRESSURE: 110 MMHG | DIASTOLIC BLOOD PRESSURE: 60 MMHG | OXYGEN SATURATION: 99 % | HEIGHT: 67 IN | HEART RATE: 79 BPM | TEMPERATURE: 96.5 F

## 2022-06-10 PROCEDURE — 99204 OFFICE O/P NEW MOD 45 MIN: CPT | Mod: 25

## 2022-06-10 PROCEDURE — 36415 COLL VENOUS BLD VENIPUNCTURE: CPT

## 2022-06-10 NOTE — PHYSICAL EXAM
[General Appearance - Alert] : alert [General Appearance - In No Acute Distress] : in no acute distress [General Appearance - Well Nourished] : well nourished [General Appearance - Well Developed] : well developed [Extraocular Movements] : extraocular movements were intact [Jugular Venous Distention Increased] : there was no jugular-venous distention [] : no respiratory distress [Respiration, Rhythm And Depth] : normal respiratory rhythm and effort [Exaggerated Use Of Accessory Muscles For Inspiration] : no accessory muscle use [Auscultation Breath Sounds / Voice Sounds] : lungs were clear to auscultation bilaterally [Heart Rate And Rhythm] : heart rate was normal and rhythm regular [Heart Sounds] : normal S1 and S2 [Edema] : there was no peripheral edema [No CVA Tenderness] : no ~M costovertebral angle tenderness [Abnormal Walk] : normal gait [Musculoskeletal - Swelling] : no joint swelling seen [No Focal Deficits] : no focal deficits [Oriented To Time, Place, And Person] : oriented to person, place, and time [FreeTextEntry1] : warm and dry

## 2022-06-10 NOTE — HISTORY OF PRESENT ILLNESS
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, amyloid polyneuropathy referred by Cardiologist Dr Salmeron for evaluation of renal function given rising creatinine.\par \par \par patient denies any active complaints at present\par denies dizziness, cp, sob, n/v/d, no edema\par no abdominal or flank pain \par no dysuria, hematuria or frothy urine\par \par no hx of nephrolithiasis, pyelonephritis or UTI's \par former smoker, no illicit drugs \par no relevant family hx \par \par no recent contrast exposure, no chronic NSAIDs use\par \par well controlled type 2 diabetes, on farxiga 5mg po qd \par hypertension well controlled with home sbp in 110's \par \par list of medications and labs reviewed - sCr 1.2 (10/2021) -> 1.3 (5/2022) w/ no hematuria or significant proteinuria -> 1.6 (6/8/22); no recent renal images available\par eplerenone 25 mg adjusted up to MWF and furosemide was held by Cardiologist due to renal fx\par continues entresto 24/26 mg po bid  \par vyndamax 61 mg po qd and enbrel 25 mg q/ other week for amyloidosis

## 2022-06-10 NOTE — ASSESSMENT
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, amyloid polyneuropathy referred by Cardiologist Dr Salmeron for evaluation of renal function given rising creatinine.\par \par #CELIA on CKD stage 3 - sCr 1.2 (10/2021) -> 1.3 (5/2022) w/ no hematuria or significant proteinuria -> 1.6 (6/8/22); no recent renal images available - unclear cause of rising creatinine, possibly pre-renal celia in setting of inadequate hydration and diuretics in combination with ACE/ARBs vs ckd progression \par - obtain renal panel with electrolytes, serum uric acid, CPK\par - obtain urinalysis w/ micro for sediment, urine protein- creatinine ratio\par - obtain renal/ bladder ultrasound \par - continue current regimen and hold on furosemide\par - maintain adequate hydration up to 1.2 - 1.5 L a day \par - list of medicine sick day rules given \par - avoid nephrotoxins/ NSAIDs\par - low salt diet, weight reduction and control \par - results will discuss with Dr Salmeron if any meds adjustment warranted \par \par #Cardiac amyloidosis/ sCHF - following by Dr Salmeron\par - on regimen as above \par \par #BPH - on flomax 0.4 mg po qd\par - obtain renal/ bladder ultrasound to r/o retention/ obstruction \par \par #T2DM - well controlled\par - on farxiga 5 mg po qd \par  \par follow up in 2 months

## 2022-06-13 ENCOUNTER — RESULT REVIEW (OUTPATIENT)
Age: 82
End: 2022-06-13

## 2022-06-13 ENCOUNTER — APPOINTMENT (OUTPATIENT)
Dept: NEPHROLOGY | Facility: CLINIC | Age: 82
End: 2022-06-13
Payer: MEDICARE

## 2022-06-13 PROCEDURE — 36415 COLL VENOUS BLD VENIPUNCTURE: CPT

## 2022-06-14 ENCOUNTER — RESULT REVIEW (OUTPATIENT)
Age: 82
End: 2022-06-14

## 2022-06-25 RX ORDER — ETANERCEPT 25 MG
KIT SUBCUTANEOUS
COMMUNITY

## 2022-06-25 RX ORDER — TAMSULOSIN HYDROCHLORIDE 0.4 MG/1
1 CAPSULE ORAL
COMMUNITY

## 2022-06-25 RX ORDER — EPLERENONE 25 MG/1
TABLET, FILM COATED ORAL
COMMUNITY

## 2022-06-25 RX ORDER — SIMVASTATIN 10 MG
TABLET ORAL
COMMUNITY

## 2022-06-25 RX ORDER — TADALAFIL 20 MG/1
TABLET ORAL
COMMUNITY

## 2022-06-25 RX ORDER — POLYETHYLENE GLYCOL 3350 17 G/17G
POWDER, FOR SOLUTION ORAL
COMMUNITY

## 2022-06-25 RX ORDER — CARVEDILOL 12.5 MG/1
TABLET ORAL
COMMUNITY

## 2022-06-25 RX ORDER — PANTOPRAZOLE SODIUM 40 MG/1
TABLET, DELAYED RELEASE ORAL
COMMUNITY

## 2022-07-18 ENCOUNTER — APPOINTMENT (OUTPATIENT)
Dept: HEART AND VASCULAR | Facility: CLINIC | Age: 82
End: 2022-07-18

## 2022-07-18 VITALS
BODY MASS INDEX: 28.25 KG/M2 | HEART RATE: 70 BPM | TEMPERATURE: 98.7 F | HEIGHT: 67 IN | OXYGEN SATURATION: 98 % | WEIGHT: 180 LBS | DIASTOLIC BLOOD PRESSURE: 60 MMHG | SYSTOLIC BLOOD PRESSURE: 110 MMHG

## 2022-07-18 PROCEDURE — 99214 OFFICE O/P EST MOD 30 MIN: CPT

## 2022-07-18 NOTE — ASSESSMENT
[FreeTextEntry1] : CHB s/p PPM (and CRT) upgrade)- f/u with Dr. Camilo\par -interrogation to see if any afib (pt in NSR still)\par \par pAfib- on eliquis BID\par -in NSR\par -on metoprolol\par \par Chronic systolic heart failure, stable NYHA II symptoms\par -entresto, BP on low side, so not able to titrate further\par -coreg 12.5mg BID\par -eplerenone 25mg qOD (re-started today)\par -Farxiga 5mg qD\par -lasix 40mg PRN for LE edema or weight gain\par \par Nonobstructive CAD- obtain cath reports ([facility])\par \par Cardiac amyloidosis\par -on Vyndamax 61mg po qd\par \par Paresthesias- f/u with Rheumatology

## 2022-07-18 NOTE — REASON FOR VISIT
[FreeTextEntry1] : 81 y/o M with pmhx of chronic systolic heart failure (LVEF ~30-35%) secondary to nonischemic cardiomyopathy, nonobstructive CAD, complete heart block with urgent PPM implantation (03/29/21), s/p upgrade to CRT-D (11/02/21), paroxysmal afib, cardiac amyloidosis (aTTR-CM-wt), CKD IIIa (Creatinine ~1.2 last), DM2, RA who presents for follow-up. \par \par Based on an echocardiogram, his cardiologist sent him for a cardiac MR to rule out an infiltrative cardiomyopathy. The cardiac MR was consistent with an infiltrative cardiomyopathy (likely amyloidosis). He had a normal SPEP and UPEP (HARPREET not performed) and was then sent for a TC-99m PYP scan which was markedly positive for ATTR-cardiac amyloidosis. In March, 2021, while on vacation in [place], [place], he had progression of cardiac conduction disease and underwent urgent implantation of a dual chamber pacemaker with Jose Alejandro Alvarado MD. His device interrogations revealed paroxysmal atrial fibrillation and ventricular ectopy. On 11/02/21, he underwent upgrade to a CRT-D device with John Camilo MD of [facility]. We had an extensive discussion about the pathophysiology, natural progression, and treatment of ATTR-cardiac amyloidosis. At present, he has complaints of LEON to 2 flights of stairs. He denies chest pain, palpitations, or lower extremity syncope. In addition, he has complaints of polyarthralgia and was diagnosed with rheumatoid arthritis but we discussed the possibility of amyloid synovial arthropathy. 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 On 12/23/21, he had an echocardiogram which revealed an EF of 33%, reduced LV GLS -14.2% (a/b+m 1.7), mild symmetrically increased LV wall thickness, grade II diastolic dysfunction, mildly reduced RV function, mildly dilated LA, aortic sclerosis, mild AI, mild TR, and CRT leads. His cardiologist at [facility] (Edison Edmonds MD) is retiring and he requested a cardiologist within the [facility] system so he was referred to me.\par \par Recent events:\par stopped lasix/eplernone but then gained weight (5lbs in 1 day)\par Feeling well, stable NYHA II symptoms\par Complains on worsening hand tingeling/paresthesias\par Legs not swollen\par \par TESTING/IMAGING REVIEWED:\par Diagnostic Tests:\par ------------------------------------------\par ECG:\par 05/05/22: A-paced, Bi-V paced. \par 12/23/21: A-paced, Bi-V paced. \par ------------------------------------------\par Echo:\par 12/23/21: EF 33%, reduced LV GLS -14.2% (a/b+m 1.7), mild symmetrically increased LV wall thickness, grade II diastolic dysfunction, mildly reduced RV function, mildly dilated LA, aortic sclerosis, mild AI, mild TR, CRT leads. \par 08/03/21: EF 35%, moderately increased LV wall thickness (1.7/1.6), grade II diastolic dysfunction, LV GLS -11% with relative apical sparring, mildly dilated RV, reduced RV function, increase RV wall thickness, markedly dilated LA, moderately dilated RA, mild MR, aortic root 3.7 cm, device leads. \par ------------------------------------------\par EP procedures:\par 11/02/21: upgrade to CRT-D. \par 03/29/21: CRT-P placement: Medtronic. \par ------------------------------------------\par Nuclear Med:\par 12/07/21: TC-99 PYP scan: grade 3/3 myocardial uptake. \par

## 2022-08-01 ENCOUNTER — TRANSCRIPTION ENCOUNTER (OUTPATIENT)
Age: 82
End: 2022-08-01

## 2022-08-08 ENCOUNTER — APPOINTMENT (OUTPATIENT)
Dept: HEART AND VASCULAR | Facility: CLINIC | Age: 82
End: 2022-08-08

## 2022-08-08 VITALS — HEIGHT: 67 IN | BODY MASS INDEX: 29.03 KG/M2 | WEIGHT: 185 LBS

## 2022-08-08 DIAGNOSIS — N18.30 CHRONIC KIDNEY DISEASE, STAGE 3 UNSPECIFIED: ICD-10-CM

## 2022-08-08 PROCEDURE — 99443: CPT | Mod: 95

## 2022-08-08 NOTE — REASON FOR VISIT
[FreeTextEntry1] : Diagnostic Tests:\par ------------------------------------------\par ECG:\par 05/05/22: A-paced, Bi-V paced. \par 12/23/21: A-paced, Bi-V paced. \par ------------------------------------------\par Echo:\par 12/23/21: EF 33%, reduced LV GLS -14.2% (a/b+m 1.7), mild symmetrically increased LV wall thickness, grade II diastolic dysfunction, mildly reduced RV function, mildly dilated LA, aortic sclerosis, mild AI, mild TR, CRT leads.  \par 08/03/21: EF 35%, moderately increased LV wall thickness (1.7/1.6), grade II diastolic dysfunction, LV GLS -11% with relative apical sparring, mildly dilated RV, reduced RV function, increase RV wall thickness, markedly dilated LA, moderately dilated RA, mild MR, aortic root 3.7 cm, device leads.  \par ------------------------------------------\par EP procedures:\par 11/02/21: upgrade to CRT-D. \par 03/29/21: CRT-P placement: Medtronic. \par ------------------------------------------\par Nuclear Med:\par 12/07/21: TC-99 PYP scan: grade 3/3 myocardial uptake.

## 2022-08-08 NOTE — ASSESSMENT
[FreeTextEntry1] : 1. Complete heart block: likely secondary to cardiac amyloidosis: s/p urgent PPM implantation (03/29/21), s/p upgrade to CRT-D (11/02/21):\par      - follow up with heart rhythm specialist, John Camilo MD and device clinic at [facility] in [place]\par \par 2. Paroxysmal atrial fibrillation: likely secondary to cardiac amyloidosis:\par      - continue systemic anticoagulation with Eliquis 5mg po bid\par      - discussed with patient avoidance of ASA and NSAIDS as well as need for bleeding surveillance \par \par 3. Chronic systolic heart failure, secondary to nonischemic cardiomyopathy, cardiac amyloidosis: NYHA II\par      - continue Entresto 24/26mg po bid (up titrate as SBP allows)\par      - continue carvedilol 12.5mg po bid (up titrate as SBP allows) \par      - continue eplerenone 25mg po qd (had mastalgia on spironolactone)\par      - continue SGLT2 inhibition with Farxiga 5mg po qd (discussed proper urinary hygiene)\par      - continue furosemide 40mg po qd prn edema \par      - standard HFrEF therapy often not well tolerated with cardiac amyloidosis but he is doing well\par \par 4. CAD: nonobstructive: CCS 0\par      - will try to obtain prior invasive coronary angiogram report\par      - continue off antiplatelet agents given need for systemic anticoagulation\par  \par 5. Cardiac amyloidosis: ATTR-CM-wt, 2 genetic variants of undetermined significance (1 autosomal recessive HFE, 1 autosomal dominant ANK2): \par     - follow up with cardiogenomics team to discuss significance of 2 variants of undetermined significance\par     - continue tetramer stabilizing agent, Vyndamax 61mg po qd   \par     - will consider adding other agents when approved for cardiac amyloidosis or if diagnosed with amyloid neuropathy\par     - will follow with serial echocardiograms (appointment already scheduled) \par \par Disease Progression Parameters:\par Date……….Prealbumin……….Troponin-I……….NT-proBNP……….LV GLS TTE………. \par 06/08/22.....25......................................................[id number]....................................................\par 12/23/21............................................................................................-14.2%....................\par \par 6. Polyneuropathy: hands: no indication for TTR silencer (patisiran or inotersen) at this time given wild type\par     - follow up with neurologist, Nathen Mehta MD\par \par 7. CKD IV: Cr 1.4, eGFR 27 via Cystatin C (06/10/22)\par    - follow up with nephrologist\par \par \par This encounter was performed as a telehealth encounter employing the [employer], or other approved audio/video platform.  All components of the evaluation and management were performed per clinical routine with the exception of the physical exam.  The physical exam references my most recent physical exam plus any additional information provided by the patient (i.e. ambulatory vitals/weight) or inspection from the video portion of the encounter. \par \par I spent in excess of 40 minutes on the encounter.  \par \par Verbal consent was given on 08/08/22 by Adilson Broussard. \par \par Patient location: The patient was located at the primary address listed in the medical record.\par Physician location: I was located in my office in [facility]. \par \par

## 2022-08-08 NOTE — HISTORY OF PRESENT ILLNESS
[FreeTextEntry1] : Mr. Broussard presents for follow up and management of chronic systolic heart failure secondary to nonischemic cardiomyopathy, nonobstructive CAD, complete heart block with urgent PPM implantation (03/29/21), s/p upgrade to CRT-D (11/02/21), paroxysmal atrial fibrillation, cardiac amyloidosis (aTTR-CM-wt), CKD IV, DM2, and "rheumatoid arthritis".  He is referred for the management of cardiac amyloidosis.  He is being treated for chronic systolic heart failure secondary to nonischemic cardiomyopathy.  Based on an echocardiogram, his cardiologist sent him for a cardiac MR to rule out an infiltrative cardiomyopathy.  The cardiac MR was consistent with an infiltrative cardiomyopathy (likely amyloidosis).  He had a normal SPEP and UPEP (HARPREET not performed) and was then sent for a TC-99m PYP scan which was markedly positive for ATTR-cardiac amyloidosis.  In March, 2021, while on vacation in [place], [place], he had progression of cardiac conduction disease and underwent urgent implantation of a dual chamber pacemaker with Jose Alejandro Alvarado MD.  His device interrogations revealed paroxysmal atrial fibrillation and ventricular ectopy.  On 11/02/21, he underwent upgrade to a CRT-D device with John Camilo MD of [facility].  We had an extensive discussion about the pathophysiology, natural progression, and treatment of ATTR-cardiac amyloidosis.  On 12/23/21, he had an echocardiogram which revealed an EF of 33%, reduced LV GLS -14.2% (a/b+m 1.7), mild symmetrically increased LV wall thickness, grade II diastolic dysfunction, mildly reduced RV function, mildly dilated LA, aortic sclerosis, mild AI, mild TR, and CRT leads.At present, he has complaints of LEON to 2 flights of stairs.  He denies chest pain, palpitations, or lower extremity syncope.  In addition, he has complaints of polyarthralgia and was diagnosed with rheumatoid arthritis but we discussed the possibility of amyloid synovial arthropathy.  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    His cardiologist at [facility] (Edison Edmonds MD) retired and he requested a cardiologist within the [facility] system so I referred him to Vik Rojas MD.  \par

## 2022-08-09 ENCOUNTER — RESULT REVIEW (OUTPATIENT)
Age: 82
End: 2022-08-09

## 2022-08-09 ENCOUNTER — APPOINTMENT (OUTPATIENT)
Dept: NEPHROLOGY | Facility: CLINIC | Age: 82
End: 2022-08-09

## 2022-08-09 VITALS
SYSTOLIC BLOOD PRESSURE: 100 MMHG | DIASTOLIC BLOOD PRESSURE: 60 MMHG | TEMPERATURE: 96.7 F | HEART RATE: 78 BPM | HEIGHT: 67 IN | BODY MASS INDEX: 29.03 KG/M2 | WEIGHT: 185 LBS | OXYGEN SATURATION: 96 %

## 2022-08-09 DIAGNOSIS — N28.1 CYST OF KIDNEY, ACQUIRED: ICD-10-CM

## 2022-08-09 PROCEDURE — 99214 OFFICE O/P EST MOD 30 MIN: CPT

## 2022-08-09 NOTE — HISTORY OF PRESENT ILLNESS
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, amyloid polyneuropathy referred by Cardiologist Dr Salmeron is following for acute kidney failure and hyperkalemia.\par \par plans to travel to [facility] in mid September \par \par no acute interim events reported\par no changes in medications \par dose not check bp at home\par bp in the office 100/60 mmHg, asymptomatic \par denies dizziness, lightheadedness, palpitations\par no chest pain, shortness of breath, or edema\par no abdominal or flank pain \par no dysuria, hematuria or frothy urine\par \par denies NSAIDs use \par  \par list of medications and labs reviewed - sCr 1.2 (10/2021) -> 1.3 (5/2022) w/ no hematuria or significant proteinuria -> 1.6 (6/8/22) -> 1.2 (6/15/22), creatinine based eGFR 57, cystatin C eGFR 27 ml/min, no proteinuria or hematuria, unremarkable renal/ bladder ultrasound, left renal cyst 1.4 cm, no hydronephrosis \par hyperuricemia 10.2 (6/10/22), not on any meds \par eplerenone 25 mg every even day and continue to hold furosemide\par continues entresto 24/26 mg po bid  \par vyndamax 61 mg po qd and enbrel 25 mg q/ other week for amyloidosis\par well controlled type 2 diabetes, on farxiga 5 mg po qd

## 2022-08-09 NOTE — PHYSICAL EXAM
[General Appearance - Alert] : alert [General Appearance - In No Acute Distress] : in no acute distress [Extraocular Movements] : extraocular movements were intact [Jugular Venous Distention Increased] : there was no jugular-venous distention [] : no respiratory distress [Respiration, Rhythm And Depth] : normal respiratory rhythm and effort [Exaggerated Use Of Accessory Muscles For Inspiration] : no accessory muscle use [Auscultation Breath Sounds / Voice Sounds] : lungs were clear to auscultation bilaterally [Heart Rate And Rhythm] : heart rate was normal and rhythm regular [Heart Sounds] : normal S1 and S2 [Edema] : there was no peripheral edema [No CVA Tenderness] : no ~M costovertebral angle tenderness [Abnormal Walk] : normal gait [No Focal Deficits] : no focal deficits [Oriented To Time, Place, And Person] : oriented to person, place, and time [FreeTextEntry1] : warm and dry

## 2022-08-09 NOTE — ASSESSMENT
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5 L a day \par - medicine sick day rules discussed - hold on entresto, eplerenone, farxiga when poor or nor not able to tolerate oral intake, vomiting, or diarrhea; monitor blood pressure, avoid hypotension \par - avoid nephrotoxins/ NSAIDs\par - low salt diet, weight reduction and control \par - obtain labs today \par \par #Hyperkalemia - likely in setting of celia, eplerenone and entresto \par - keep 2g potassium diet, list of high potassium food to avoid was given \par - labs today \par \par #Cardiac amyloidosis/ sCHF - following by Dr Salmeron\par - eplerenone 25 mg every even day and continue to hold on furosemide\par - entresto 24/26 mg po bid - might need to cut down to qd given soft bp without eplerenone today \par - start home blood pressure monitoring, hold on antihypertensives when sbp <130 mmHg \par - vyndamax 61 mg po qd and enbrel 25 mg q/ other week for amyloidosis \par \par #BPH - on flomax 0.4 mg po qd\par - renal/ bladder sono with no retention \par \par #T2DM - well controlled\par - on farxiga 5 mg po qd \par \par #Hyperuricemia - hyperuricemia 10.2 (6/10/22)\par - check level today, if remains elevated start allopurinol 100 mg po qd \par  \par #Left renal cyst - simple left renal cyst 1.4 cm, no hydronephrosis \par \par follow up in 2-3 months

## 2022-08-12 ENCOUNTER — NON-APPOINTMENT (OUTPATIENT)
Age: 82
End: 2022-08-12

## 2022-08-15 RX ORDER — SODIUM POLYSTYRENE SULFONATE 15 G/60ML
15 SUSPENSION ORAL; RECTAL
Qty: 8 | Refills: 3 | Status: ACTIVE | COMMUNITY
Start: 2022-06-10 | End: 1900-01-01

## 2022-10-10 ENCOUNTER — APPOINTMENT (OUTPATIENT)
Dept: HEART AND VASCULAR | Facility: CLINIC | Age: 82
End: 2022-10-10

## 2022-10-10 VITALS
HEART RATE: 64 BPM | OXYGEN SATURATION: 98 % | WEIGHT: 185 LBS | BODY MASS INDEX: 29.03 KG/M2 | HEIGHT: 67 IN | DIASTOLIC BLOOD PRESSURE: 68 MMHG | SYSTOLIC BLOOD PRESSURE: 108 MMHG

## 2022-10-10 PROCEDURE — 99214 OFFICE O/P EST MOD 30 MIN: CPT

## 2022-10-10 RX ORDER — IPRATROPIUM BROMIDE 42 UG/1
0.06 SPRAY NASAL
Qty: 45 | Refills: 0 | Status: ACTIVE | COMMUNITY
Start: 2022-09-11

## 2022-10-11 ENCOUNTER — RESULT REVIEW (OUTPATIENT)
Age: 82
End: 2022-10-11

## 2022-10-11 ENCOUNTER — APPOINTMENT (OUTPATIENT)
Dept: NEPHROLOGY | Facility: CLINIC | Age: 82
End: 2022-10-11

## 2022-10-11 VITALS
OXYGEN SATURATION: 99 % | WEIGHT: 185 LBS | HEIGHT: 67 IN | BODY MASS INDEX: 29.03 KG/M2 | DIASTOLIC BLOOD PRESSURE: 60 MMHG | TEMPERATURE: 96.9 F | SYSTOLIC BLOOD PRESSURE: 100 MMHG | HEART RATE: 69 BPM

## 2022-10-11 DIAGNOSIS — E87.5 HYPERKALEMIA: ICD-10-CM

## 2022-10-11 DIAGNOSIS — N18.9 ACUTE KIDNEY FAILURE, UNSPECIFIED: ICD-10-CM

## 2022-10-11 DIAGNOSIS — E79.0 HYPERURICEMIA W/OUT SIGNS OF INFLAMMATORY ARTHRITIS AND TOPHACEOUS DISEASE: ICD-10-CM

## 2022-10-11 DIAGNOSIS — N17.9 ACUTE KIDNEY FAILURE, UNSPECIFIED: ICD-10-CM

## 2022-10-11 DIAGNOSIS — N40.0 BENIGN PROSTATIC HYPERPLASIA WITHOUT LOWER URINARY TRACT SYMPMS: ICD-10-CM

## 2022-10-11 PROCEDURE — 99214 OFFICE O/P EST MOD 30 MIN: CPT

## 2022-10-11 NOTE — ASSESSMENT
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, amyloid polyneuropathy presenting here today for follow up for renal function.\par \par records, medication list reviewed\par no recent labs available \par \par #CELIA on CKD stage 3 - sCr 1.2 (10/2021) -> 1.3 (5/2022) -> 1.6 (6/8/22) -> 1.2 (6/15/22) ->1.4, creatinine based eGFR 47, cystatin C eGFR 37 ml/min (8/9/22), no proteinuria or hematuria, unremarkable renal/ bladder ultrasound, left renal cyst 1.4 cm, no hydronephrosis \par - likely pre-renal celia in setting of inadequate hydration, diuretics and  ACE/ARBs use\par - no recent labs, will obtain renal panel today \par - continue current regimen and continue to hold on furosemide\par - maintain adequate hydration up to 1.2 - 1.5 L a day \par - keep medicine sick day rules (hold on entresto, eplerenone, farxiga when poor or nor not able to tolerate oral intake, vomiting, or diarrhea; monitor blood pressure, avoid hypotension)\par - continue low salt diet, weight reduction and control \par - avoid nephrotoxins/ NSAIDs\par - renally adjusted meds/ ABx\par \par #Hyperkalemia - likely in setting of celia, eplerenone and entresto\par - K 5.4 (8/9/22), treated with SPS \par - continue strict 2g potassium diet\par - labs today \par \par #Cardiac amyloidosis/ sCHF - following by Dr Salmeron\par - eplerenone 25 mg every even day and continue to hold on furosemide\par - entresto 24/26 mg po bid - might need to cut down to qd given soft bp without eplerenone today \par - start home blood pressure monitoring, hold on antihypertensives when sbp <130 mmHg \par - vyndamax 61 mg po qd and enbrel 25 mg q/ other week for amyloidosis \par \par #BPH - on flomax 0.4 mg po qd, continue \par - renal/ bladder sono reviewed, no retention \par \par #T2DM - well controlled\par - on farxiga 5 mg po qd \par - check a1c today \par \par #Hyperuricemia - hyperuricemia 10.2 (6/10/22) -> 8.8 (8/9/22)\par - likely in setting of diuresis and dehydration, improved \par - will check level today\par  \par #Left renal cyst - simple left renal cyst 1.4 cm, no hydronephrosis \par - no follow up required \par \par follow up in 3 months

## 2022-10-11 NOTE — HISTORY OF PRESENT ILLNESS
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, amyloid polyneuropathy presenting here today for follow up for renal function.\par \par \par return from trip to [place] \par s/p recent stomach virus with vomiting and diarrhea\par reports holding sick day medications such as eplerenone and entresto\par patient is not taking furosemide 40 mg on daily basis, only as needed for peripheral edema \par \par no fever, chills, no dizziness, lightheadedness, blurry vision\par no chest pain, shortness of breath, or edema\par no nausea, vomiting or diarrhea \par no abdominal or flank pain \par no dysuria, hematuria or frothy urine\par \par denies NSAIDs use \par  \par

## 2022-10-12 NOTE — REVIEW OF SYSTEMS
[Fever] : no fever [Feeling Fatigued] : feeling fatigued [Blurry Vision] : no blurred vision [Earache] : no earache [SOB] : no shortness of breath [Dyspnea on exertion] : dyspnea during exertion [Chest Discomfort] : no chest discomfort [Lower Ext Edema] : no extremity edema [Palpitations] : no palpitations [Orthopnea] : no orthopnea [PND] : no PND [Syncope] : no syncope [Cough] : no cough [Abdominal Pain] : no abdominal pain [Urinary Frequency] : no change in urinary frequency [Joint Pain] : no joint pain [Rash] : no rash [Dizziness] : no dizziness [Confusion] : no confusion was observed [Easy Bleeding] : no tendency for easy bleeding

## 2022-10-12 NOTE — ASSESSMENT
[FreeTextEntry1] : 83 y/o M with pmhx of chronic systolic heart failure (LVEF ~30-35%) secondary to nonischemic cardiomyopathy, nonobstructive CAD, complete heart block with urgent PPM implantation (03/29/21), s/p upgrade to CRT-D (11/02/21), paroxysmal afib, cardiac amyloidosis (aTTR-CM-wt), CKD IIIa (Creatinine ~1.2 last), DM2, RA who presents for follow-up. \par -eliquis refill\par -PPM check with Dr. Camilo\par -on Farxiga/coreg/entresto/eplernone (GDMT for systolic CHF)\par -on Eliquis for pAfib\par -Amyloidosis stable-> on Vyndamax (follows with Dr. Finkelstein)\par -euvolemic on exam and HR/BR controlled

## 2022-10-12 NOTE — REASON FOR VISIT
[FreeTextEntry1] : 81 y/o M with pmhx of chronic systolic heart failure (LVEF ~30-35%) secondary to nonischemic cardiomyopathy, nonobstructive CAD, complete heart block with urgent PPM implantation (03/29/21), s/p upgrade to CRT-D (11/02/21), paroxysmal afib, cardiac amyloidosis (aTTR-CM-wt), CKD IIIa (Creatinine ~1.2 last), DM2, RA who presents for follow-up. \par CUrrently feeling well, NYHA II symptoms (stable, no changes)\par No LE edema\par Paresthesias stable (not much changed since last time\par \par TESTING/IMAGING REVIEWED:\par \par ECG:\par 05/05/22: A-paced, Bi-V paced. \par \par Echo:\par 12/23/21: EF 33%, reduced LV GLS -14.2% (a/b+m 1.7), mild symmetrically increased LV wall thickness, grade II diastolic dysfunction, mildly reduced RV function, mildly dilated LA, aortic sclerosis, mild AI, mild TR, CRT leads. \par 08/03/21: EF 35%, moderately increased LV wall thickness (1.7/1.6), grade II diastolic dysfunction, LV GLS -11% with relative apical sparring, mildly dilated RV, reduced RV function, increase RV wall thickness, markedly dilated LA, moderately dilated RA, mild MR, aortic root 3.7 cm, device leads. \par \par EP procedures:\par 11/02/21: upgrade to CRT-D. \par 03/29/21: CRT-P placement: Medtronic. \par \par Nuclear Med:\par 12/07/21: TC-99 PYP scan: grade 3/3 myocardial uptake.

## 2022-10-17 ENCOUNTER — APPOINTMENT (OUTPATIENT)
Dept: HEART AND VASCULAR | Facility: CLINIC | Age: 82
End: 2022-10-17

## 2022-12-12 ENCOUNTER — APPOINTMENT (OUTPATIENT)
Dept: HEART AND VASCULAR | Facility: CLINIC | Age: 82
End: 2022-12-12

## 2022-12-12 VITALS
HEART RATE: 87 BPM | HEIGHT: 67 IN | BODY MASS INDEX: 30.45 KG/M2 | DIASTOLIC BLOOD PRESSURE: 74 MMHG | SYSTOLIC BLOOD PRESSURE: 118 MMHG | WEIGHT: 194 LBS | OXYGEN SATURATION: 100 %

## 2022-12-12 PROCEDURE — 99214 OFFICE O/P EST MOD 30 MIN: CPT | Mod: 25

## 2022-12-12 PROCEDURE — 93306 TTE W/DOPPLER COMPLETE: CPT | Mod: 59

## 2022-12-12 NOTE — ASSESSMENT
[FreeTextEntry1] : 1. Complete heart block: likely secondary to cardiac amyloidosis: s/p urgent PPM implantation (03/29/21), s/p upgrade to CRT-D (11/02/21):\par      - follow up with heart rhythm specialist, John Camilo MD and device clinic at [facility] in [place]\par \par 2. Paroxysmal atrial fibrillation: likely secondary to cardiac amyloidosis:\par      - continue systemic anticoagulation with Eliquis 5mg po bid\par      - discussed with patient avoidance of ASA and NSAIDS as well as need for bleeding surveillance \par \par 3. Chronic systolic heart failure, secondary to nonischemic cardiomyopathy, cardiac amyloidosis: NYHA II\par      - continue Entresto 24/26mg po bid (up titrate as SBP allows)\par      - decrease carvedilol from 12.5mg po bid to 6.25mg po bid (poorly tolerating beta blockers secondary to ATTR-CM)\par      - continue eplerenone 25mg po qd (had mastalgia on spironolactone)\par      - continue SGLT2 inhibition with Farxiga 5mg po qd (discussed importance of proper urinary hygiene)\par      - continue furosemide 40mg po qd prn edema (encouraged increased use)\par \par 4. CAD: nonobstructive: CCS 0\par      - will try to obtain prior invasive coronary angiogram report\par      - continue off antiplatelet agents given need for systemic anticoagulation\par  \par 5. Cardiac amyloidosis: ATTR-CM-wt, 2 genetic variants of undetermined significance (1 autosomal recessive HFE, 1 autosomal dominant ANK2): \par     - follow up with cardiogenomics team to discuss significance of 2 variants of undetermined significance\par     - continue tetramer stabilizing agent, Vyndamax 61mg po qd   \par     - will consider adding other agents when approved for cardiac amyloidosis or if diagnosed with amyloid neuropathy\par     - will follow with serial echocardiograms \par \par Disease Progression Parameters:\par Date……….Prealbumin……….Troponin-I……….NT-proBNP……….LV GLS TTE………. \par 12/12/22............................................................................................-14.4%...................\par 06/08/22.....25......................................................[id number]....................................................\par 12/23/21............................................................................................-14.2%....................\par \par 6. Polyneuropathy: hands: no indication for TTR silencer (patisiran or inotersen) at this time given wild type\par     - follow up with neurologist, Nathen Mehta MD\par \par 7. CKD IV: Cr 1.4, eGFR 36 via Cystatin C 10/11/22):\par    - follow up with nephrologist\par \par

## 2022-12-12 NOTE — REASON FOR VISIT
[FreeTextEntry1] : Diagnostic Tests:\par ------------------------------------------\par ECG:\par 10/10/22: A-paced, Bi-V paced. \par 05/05/22: A-paced, Bi-V paced. \par 12/23/21: A-paced, Bi-V paced. \par ------------------------------------------\par Echo:\par 12/12/22: EF 34%, reduced LV GLS -14.4%, moderate symmetrically increased LV wall thickness, grade II diastolic dysfunction, mildly reduced RV function, mildly increased RV wall thickness, mildly dilated LA, aortic sclerosis, mild-to-moderate TR, CRT leads.  \par 12/23/21: EF 33%, reduced LV GLS -14.2% (a/b+m 1.7), mild symmetrically increased LV wall thickness, grade II diastolic dysfunction, mildly reduced RV function, mildly dilated LA, aortic sclerosis, mild AI, mild TR, CRT leads.  \par 08/03/21: EF 35%, moderately increased LV wall thickness (1.7/1.6), grade II diastolic dysfunction, LV GLS -11% with relative apical sparring, mildly dilated RV, reduced RV function, increase RV wall thickness, markedly dilated LA, moderately dilated RA, mild MR, aortic root 3.7 cm, device leads.  \par ------------------------------------------\par EP procedures:\par 11/02/21: upgrade to CRT-D. \par 03/29/21: CRT-P placement: Medtr[employer]. \par ------------------------------------------\par Nuclear Med:\par 12/07/21: TC-99 PYP scan: grade 3/3 myocardial uptake.

## 2023-02-01 ENCOUNTER — TRANSCRIPTION ENCOUNTER (OUTPATIENT)
Age: 83
End: 2023-02-01

## 2023-03-13 ENCOUNTER — APPOINTMENT (OUTPATIENT)
Dept: HEART AND VASCULAR | Facility: CLINIC | Age: 83
End: 2023-03-13
Payer: MEDICARE

## 2023-03-13 VITALS
DIASTOLIC BLOOD PRESSURE: 70 MMHG | HEIGHT: 67 IN | BODY MASS INDEX: 28.88 KG/M2 | SYSTOLIC BLOOD PRESSURE: 118 MMHG | WEIGHT: 184 LBS | HEART RATE: 68 BPM

## 2023-03-13 PROCEDURE — 99443: CPT | Mod: 95

## 2023-03-15 NOTE — ASSESSMENT
[FreeTextEntry1] : 1. Complete heart block: likely secondary to cardiac amyloidosis: s/p urgent PPM implantation (03/29/21), s/p upgrade to CRT-D (11/02/21):\par      - follow up with heart rhythm specialist, John Camilo MD and device clinic at [facility] in [place]\par \par 2. Paroxysmal atrial fibrillation: likely secondary to cardiac amyloidosis:\par      - continue systemic anticoagulation with Eliquis 5mg po bid\par      - discussed with patient avoidance of ASA and NSAIDS as well as need for bleeding surveillance \par \par 3. Chronic systolic heart failure, secondary to nonischemic cardiomyopathy, cardiac amyloidosis: NYHA II\par      - continue Entresto 24/26mg po bid (up titrate as SBP allows)\par      - continue carvedilol 6.25mg po bid (not able to tolerate 12.5mg po bid secondary to ATTR)\par      - continue eplerenone 25mg po qd (had mastalgia on spironolactone)\par      - continue SGLT2 inhibition with Farxiga 5mg po qd (discussed importance of proper urinary hygiene)\par      - continue furosemide 20mg po qd prn edema \par \par 4. CAD: nonobstructive: CCS 0\par      - will try to obtain prior invasive coronary angiogram report\par      - continue off antiplatelet agents given need for systemic anticoagulation\par  \par 5. Cardiac amyloidosis: ATTR-CM-wt, 2 genetic variants of undetermined significance (1 autosomal recessive HFE, 1 autosomal dominant ANK2): \par     - follow up periodically with cardiogenomics team to discuss significance of 2 variants of undetermined significance\par     - continue tetramer stabilizing agent, Vyndaquel 20mg 4 tabs po qd   \par     - will consider adding other agents when approved for cardiac amyloidosis or if diagnosed with amyloid neuropathy\par     - will follow with serial echocardiograms \par \par Disease Progression Parameters:\par Date……….Prealbumin……….Troponin-I……….NT-proBNP……….LV GLS TTE………. \par 01/2023..................initiated Vyndaquel............................................................................\par 12/12/22............................................................................................-14.4%...................\par 06/08/22.....25......................................................[id number]....................................................\par 12/23/21............................................................................................-14.2%....................\par \par 6. Polyneuropathy: hands: no indication for TTR silencer (patisiran or inotersen) at this time given wild type\par     - follow up with neurologist, Nathen Mehta MD\par \par 7. CKD IV: Cr 1.4, eGFR 36 via Cystatin C (10/11/22):\par    - follow up with nephrologist\par \par \par This encounter was performed as a telehealth encounter employing the [employer], [employer], or other approved audio/video platform.  All components of the evaluation and management were performed per clinical routine with the exception of the physical exam.  The physical exam references my most recent physical exam plus any additional information provided by the patient (i.e. ambulatory vitals/weight) or inspection from the video portion of the encounter. \par \par I spent in excess of 40 minutes on the encounter.  \par \par Verbal consent was given on 03/13/23 Adilson Broussard MD. \par \par Patient location: The patient was located at the primary address listed in the medical record.\par Physician location: I was located in my office in [facility].

## 2023-03-15 NOTE — HISTORY OF PRESENT ILLNESS
[FreeTextEntry1] : Mr. Broussard presents for follow up and management of chronic systolic heart failure secondary to nonischemic cardiomyopathy, nonobstructive CAD, complete heart block with urgent PPM implantation (03/29/21), s/p upgrade to CRT-D (11/02/21), paroxysmal atrial fibrillation, cardiac amyloidosis (aTTR-CM-wt), CKD IV, DM2, and "rheumatoid arthritis".  He was referred to me for the management of cardiac amyloidosis.  He was being treated for chronic systolic heart failure secondary to nonischemic cardiomyopathy.  Based on an echocardiogram, his cardiologist sent him for a cardiac MRI to rule out an infiltrative cardiomyopathy.  The cardiac MRI was consistent with an infiltrative cardiomyopathy (likely amyloidosis).  He had a normal SPEP and UPEP (HARPREET not performed) and was then sent for a TC-99m PYP scan which was markedly positive for ATTR-cardiac amyloidosis.  In March, 2021, while on vacation in [place], [place], he had progression of cardiac conduction disease and underwent urgent implantation of a dual chamber pacemaker with Jose Alejandro Alvarado MD.  His device interrogations revealed paroxysmal atrial fibrillation and ventricular ectopy.  On 11/02/21, he underwent upgrade to a CRT-D device with John Camilo MD of [facility].  We had an extensive discussion about the pathophysiology, natural progression, and treatment of ATTR-cardiac amyloidosis.  Today, 12/12/22, he had an echocardiogram which revealed an EF of 34%, reduced LV GLS -14.4%, moderate symmetrically increased LV wall thickness, grade II diastolic dysfunction, mildly reduced RV function, mildly increased RV wall thickness, mildly dilated LA, aortic sclerosis, mild-to-moderate TR, and CRT leads.   In addition, he has complaints of polyarthralgia and was diagnosed with rheumatoid arthritis but we discussed the possibility of amyloid synovial arthropathy.  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  At present, he has mild LEON and

## 2023-03-15 NOTE — REASON FOR VISIT
[Other: ____] : [unfilled] [FreeTextEntry1] : Diagnostic Tests:\par ------------------------------------------\par ECG:\par 10/10/22: A-paced, Bi-V paced. \par 05/05/22: A-paced, Bi-V paced. \par 12/23/21: A-paced, Bi-V paced. \par ------------------------------------------\par Echo:\par 12/12/22: EF 34%, reduced LV GLS -14.4%, moderate symmetrically increased LV wall thickness, grade II diastolic dysfunction, mildly reduced RV function, mildly increased RV wall thickness, mildly dilated LA, aortic sclerosis, mild-to-moderate TR, CRT leads.  \par 12/23/21: EF 33%, reduced LV GLS -14.2% (a/b+m 1.7), mild symmetrically increased LV wall thickness, grade II diastolic dysfunction, mildly reduced RV function, mildly dilated LA, aortic sclerosis, mild AI, mild TR, CRT leads.  \par 08/03/21: EF 35%, moderately increased LV wall thickness (1.7/1.6), grade II diastolic dysfunction, LV GLS -11% with relative apical sparring, mildly dilated RV, reduced RV function, increase RV wall thickness, markedly dilated LA, moderately dilated RA, mild MR, aortic root 3.7 cm, device leads.  \par ------------------------------------------\par EP procedures:\par 11/02/21: upgrade to CRT-D. \par 03/29/21: CRT-P placement: Medtronic. \par ------------------------------------------\par Nuclear Med:\par 12/07/21: TC-99 PYP scan: grade 3/3 myocardial uptake.

## 2023-06-10 NOTE — PHYSICAL EXAM
[Well Developed] : well developed [Well Nourished] : well nourished [No Acute Distress] : no acute distress [Normal Conjunctiva] : normal conjunctiva [Normal Venous Pressure] : normal venous pressure [Normal S1, S2] : normal S1, S2 [No Carotid Bruit] : no carotid bruit [No Murmur] : no murmur [No Rub] : no rub [No Gallop] : no gallop [Clear Lung Fields] : clear lung fields [Good Air Entry] : good air entry [No Respiratory Distress] : no respiratory distress  [Soft] : abdomen soft [Non Tender] : non-tender [No Masses/organomegaly] : no masses/organomegaly [Normal Bowel Sounds] : normal bowel sounds [Normal Gait] : normal gait [No Edema] : no edema [No Cyanosis] : no cyanosis [No Clubbing] : no clubbing [No Rash] : no rash [No Varicosities] : no varicosities [No Skin Lesions] : no skin lesions [Moves all extremities] : moves all extremities [No Focal Deficits] : no focal deficits [Normal Speech] : normal speech [Normal memory] : normal memory [Alert and Oriented] : alert and oriented

## 2023-06-13 ENCOUNTER — APPOINTMENT (OUTPATIENT)
Dept: HEART AND VASCULAR | Facility: CLINIC | Age: 83
End: 2023-06-13
Payer: MEDICARE

## 2023-06-13 VITALS
SYSTOLIC BLOOD PRESSURE: 107 MMHG | BODY MASS INDEX: 28.09 KG/M2 | DIASTOLIC BLOOD PRESSURE: 88 MMHG | WEIGHT: 179 LBS | HEIGHT: 67 IN | HEART RATE: 98 BPM

## 2023-06-13 PROCEDURE — 99215 OFFICE O/P EST HI 40 MIN: CPT | Mod: 95

## 2023-06-13 NOTE — HISTORY OF PRESENT ILLNESS
[FreeTextEntry1] : Mr. Broussard presents for follow up and management of chronic systolic heart failure secondary to nonischemic cardiomyopathy, nonobstructive CAD, complete heart block with urgent PPM implantation (03/29/21), s/p upgrade to CRT-D (11/02/21), persistent atrial fibrillation, cardiac amyloidosis (aTTRwt-CM), CKD IV, DM2, and "rheumatoid arthritis".  He was referred to me for the management of cardiac amyloidosis.  He was being treated for chronic systolic heart failure secondary to nonischemic cardiomyopathy.  Based on an echocardiogram, his cardiologist sent him for a cardiac MRI to rule out an infiltrative cardiomyopathy.  The cardiac MRI was consistent with an infiltrative cardiomyopathy (likely amyloidosis).  He had a normal SPEP and UPEP (HARPREET not performed) and was then sent for a TC-99m PYP scan which was markedly positive for ATTR-cardiac amyloidosis.  In March, 2021, while on vacation in [place], [place], he had progression of cardiac conduction disease and underwent urgent implantation of a dual chamber pacemaker with Jose Alejandro Alvarado MD.  On 11/02/21, he underwent upgrade to a CRT-D device with John Camilo MD of [facility].  We had an extensive discussion about the pathophysiology, natural progression, and treatment of ATTR-cardiac amyloidosis.  On 12/12/22, he had an echocardiogram which revealed an EF of 34%, reduced LV GLS -14.4%, moderate symmetrically increased LV wall thickness, grade II diastolic dysfunction, mildly reduced RV function, mildly increased RV wall thickness, mildly dilated LA, aortic sclerosis, mild-to-moderate TR, and CRT leads.   In addition, he has complaints of polyarthralgia and was diagnosed with rheumatoid arthritis but we discussed the possibility of amyloid synovial arthropathy.  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  Since his last visit, his electrophysiologist noted his atrial fibrillation to be persistent and raised the lower rate limit of his device to try to assist with rhythm control.

## 2023-06-13 NOTE — ASSESSMENT
[FreeTextEntry1] : 1. Complete heart block: likely secondary to cardiac amyloidosis: s/p urgent PPM implantation (03/29/21), s/p upgrade to CRT-D (11/02/21):\par      - follow up with heart rhythm specialist, John Camilo MD and device clinic at [facility] in [place]\par \par 2. Persistent atrial fibrillation: likely secondary to cardiac amyloidosis:\par      - continue systemic anticoagulation with Eliquis 5mg po bid\par      - discussed with patient avoidance of ASA and NSAIDS as well as need for bleeding surveillance \par      - follow up with heart rhythm specialist, John Camilo MD and device clinic at [facility] in [place]\par \par 3. Chronic systolic heart failure, secondary to nonischemic cardiomyopathy, cardiac amyloidosis: NYHA II:\par      - continue Entresto 24/26mg po bid (up titrate as SBP allows)\par      - continue carvedilol 6.25mg po bid (not able to tolerate 12.5mg po bid secondary to ATTR)\par      - continue eplerenone 25mg po qd (had mastalgia on spironolactone)\par      - continue SGLT2 inhibition with Farxiga 5mg po qd (discussed importance of proper urinary hygiene)\par      - continue furosemide 20mg po qd prn edema \par \par 4. CAD: nonobstructive: CCS 0: \par      - will try to obtain prior invasive coronary angiogram report\par      - continue off antiplatelet agents given need for systemic anticoagulation\par  \par 5. Cardiac amyloidosis: ATTRwt-CM, 2 genetic variants of undetermined significance (1 autosomal recessive HFE, 1 autosomal dominant ANK2): \par     - follow up periodically with cardiogenomics team to discuss significance of 2 variants of undetermined significance\par     - continue tetramer stabilizing agent, Vyndaquel 20mg 4 tabs po qd   \par     - will consider adding other agents when approved for cardiac amyloidosis or if diagnosed with amyloid neuropathy\par     - will follow with serial echocardiograms (ordered today) \par \par Disease Progression Parameters:\par Date……….Prealbumin……….Troponin-I……….NT-proBNP……….LV GLS TTE………. \par 01/2023..................initiated Vyndaquel............................................................................\par 12/12/22............................................................................................-14.4%...................\par 06/08/22.....25......................................................[id number]....................................................\par 12/23/21............................................................................................-14.2%....................\par \par 6. Polyneuropathy: hands: no indication for TTR silencer (patisiran or inotersen) at this time given wild type: \par     - follow up with neurologist, Nathen Mehta MD\par \par 7. CKD IV: Cr 1.3, eGFR 52 (02/02/23): :\par    - follow up with nephrologist\par \par \par This encounter was performed as a telehealth encounter employing the [employer] Solo, [employer], or other approved audio/video platform.  All components of the evaluation and management were performed per clinical routine with the exception of the physical exam.  The physical exam references my most recent physical exam plus any additional information provided by the patient (i.e. ambulatory vitals/weight) or inspection from the video portion of the encounter. \par \par I spent in excess of 40 minutes on the encounter.  \par \par Verbal consent was given on 03/13/23 Adilson Broussard MD. \par \par Patient location: The patient was located at the primary address listed in the medical record.\par Physician location: I was located in my office in [facility].

## 2023-11-03 ENCOUNTER — RX RENEWAL (OUTPATIENT)
Age: 83
End: 2023-11-03

## 2023-11-14 NOTE — PATIENT PROFILE ADULT - DOMESTIC TRAVEL HIGH RISK QUESTION
Diagnosis:   1. Chemotherapy management, encounter for    2. Encounter for monoclonal antibody treatment for malignancy    3. Malignant neoplasm of central portion of left breast in female, estrogen receptor positive (CMD)       Regimen: Perjeta/Kanjinti/Taxotere  Cycle/Day: C2D1    Dr. Carroll is ordering clinician today.    Vital Signs:  ONC OP Encounter Vitals  BP: 114/55  Heart Rate: 72  Resp: 16  Temp: 98.2 °F (36.8 °C)  Temp src: Oral  SpO2: 97 %  Weight: 78 kg (172 lb 1.1 oz)  Pain Score:  0    Allergies:    ALLERGIES:   Allergen Reactions   • Seasonal Other (See Comments)   • Lisinopril Cough     Cough  Cough  Cough  Cough  Cough  Cough  Cough  Cough        Medications:The medication list was reviewed. No changes noted.     ECOG: ECOG Performance Status: 0    Distress Screening: Is this day one of cycle or a new regimen? No No, patient did not indicate any new concerns. Refer to most recent PHQ2/9 score    Toxicity Assessment:   Auditory/Ear  Assessment: Yes (Within Defined Limits)    Cardiac General  Assessment: Yes (Within Defined Limits)    Dermatology/Skin  Assessment: Yes (w/ Exceptions to WDL)  Alopecia: Grade 2    Constitutional  Assessment: Yes (w/ Exceptions to WDL)  Fatigue: Grade 2    Endocrine  Assessment: Yes (Within Defined Limits)    Gastrointestinal  Assessment: Yes (w/ Exceptions to WDL)  Anorexia: Grade 2  Diarrhea: Grade 1  Nausea: Grade 1    Hemorrhage/Bleeding  Assessment: Yes (Within Defined Limits)    Infection  Assessment: Yes (Within Defined Limits)    Lymphatics  Assessment: Yes (Within Defined Limits)    Musculoskeletal  Assessment: Yes (Within Defined Limits)    Neurology  Assessment: Yes (w/ Exceptions to WDL)  Dizziness: Grade 1  Anxiety: Grade 1    Ocular  Assessment: Yes (Within Defined Limits)    Pain  Assessment: Yes (Within Defined Limits)    Pulmonary/Upper Respiratory  Assessment: Yes (Within Defined Limits)    Genitourinary  Assessment: Yes (Within Defined  Limits)    Additional Nursing Assessment: In addition to above toxicity assessment, this RN assessed that patient arrived ambulatory to clinic with son, REYNALDO/Ox4. Patient reports moderate fatigue, decreased appetite, lightheadedness, and occasional nausea/diarrhea which is managed with zofran and imodium. Evaluated and labs reviewed by shanique Mccloud to proceed with treatment today. All infusions given through port, tolerated well by patient. Taxotere titrated per nursing protocol. Cryotherapy utilized on hands/feet throughout treatment. Neulasta onbody applied to left lower abdominal quadrant at 15:00.    Patient requested to receive IV hydration next week. Appointment scheduled  (1L NS to be given per therapy plan). Confirmed with SWETHA Ramirez to draw CBC as well.      Prechemo Checklist  Chemo Consent Signed: Yes  Protocol Verified: Yes  Is Protocol Standard of Care or Research?: Standard of Care  Pre-Chemo Labs Reviewed?: Yes  Provider Notified of Abnormal Labs Not Meeting Treatment Conditions: N/A  Pregnancy Screening Performed (if indicated): Not applicable  All Treatment Conditions Met?: Yes  BSA/weight in Orders Verified for Weight-Based Drugs?: Yes  Chemo Dose Calculations Verified: Yes  Second RN Verified Calculations: VD    Pre-Treatment: Patient has valid pre-authorization, Premed orders, including hydration, are verified prior to administration and I have reviewed the following with the patient: Name of chemo drug, duration and route of infusion, Infusion/Drug volume and dose, and reportable infusion-related symptoms.     Treatment: Refer to [facility] and MAR for line assessment and medication administration, Chemotherapy has not ; double checked & verified by two practitioners, Appearance and physical integrity of drugs meets standard of drug monograph; double checked & verified by two practitioners, Rate set on infusion pump is in alignment with ordered rate; double checked & verified by two  practitioners, Drugs were administered in proper sequencing, Blood return confirmed before, during and after treatment administered, Infusion pump used for non-vesicant drugs and Neulasta On-Body Injector Applied    Post Treatment: Treatment tolerated well; no adverse reaction    Oral Chemotherapy: No.    Education: Instructed on home medication administration schedule and Neulasta On-Body Injector Education   Writer reviewed with patient on Neulasta On-Body Injector  Patient aware that flashing green light means injector is working properly.  Patient aware that red light means injector is not working properly and they need to call clinic.  Patient aware that medication will inject 27 hours after it is applied. Medication will inject at 18:00.  Patient is not to soak/bathe while injector is in place.  Patient may shower & swim up to 8 ft for 1 hour.   Patient should avoid traveling, driving or operating heavy machinery during hour 26 through hour 29 after the On-Body Injector is applied.  Patient should avoid sleeping on the On-Body Injector or applying pressure on the On-Body Injector.  The On-Body Injector may not work properly.  Patient should keep the On-Body Injector at least 4 inches away from electrical equipment such as cell phones, cordless telephones, microwaves and other common appliances.  If the On-Body Injector is too close to electrical equipment, it may not work correctly and can lead to a missed or incomplete dose.  Patient aware it will take 45 minutes for medication to inject.   Patient is to check occasionally to make sure On-Body Injector is flashing a green light.  Patient is to make sure On-Body Injector is not leaking - if it leaks patient is to call clinic.  Patient is aware that if On-Body Injector falls off, he/she should not replace it on to his/her body. He/she should call clinic immediately.  Once medication is completely instilled, the light will be solid green or pump will turn off  and patient can remove On-Body Injector.  Patient to place empty On-Body Injector into a sharps container.  Keep the on-body injector dry for the last 3 hours prior to the dose delivery start.     Next appointment scheduled: 11/20/23 for infusion only (CBC+ 1L hydration) & 12/5/23 for labs/PA visit/infusion (C3D1).  Patient instructed to call the office with any questions or concerns.    Patient Discharged: patient discharged to home per self, ambulatory and in stable condition.   No

## 2023-12-14 ENCOUNTER — APPOINTMENT (OUTPATIENT)
Dept: HEART AND VASCULAR | Facility: CLINIC | Age: 83
End: 2023-12-14
Payer: MEDICARE

## 2023-12-14 ENCOUNTER — LABORATORY RESULT (OUTPATIENT)
Age: 83
End: 2023-12-14

## 2023-12-14 ENCOUNTER — NON-APPOINTMENT (OUTPATIENT)
Age: 83
End: 2023-12-14

## 2023-12-14 VITALS
BODY MASS INDEX: 28.88 KG/M2 | OXYGEN SATURATION: 97 % | DIASTOLIC BLOOD PRESSURE: 65 MMHG | SYSTOLIC BLOOD PRESSURE: 100 MMHG | HEIGHT: 67 IN | HEART RATE: 70 BPM | WEIGHT: 184 LBS | TEMPERATURE: 98.3 F

## 2023-12-14 PROCEDURE — 99215 OFFICE O/P EST HI 40 MIN: CPT | Mod: 25

## 2023-12-14 PROCEDURE — 93306 TTE W/DOPPLER COMPLETE: CPT | Mod: 59

## 2023-12-14 PROCEDURE — 93356 MYOCRD STRAIN IMG SPCKL TRCK: CPT | Mod: 59

## 2023-12-14 PROCEDURE — 93000 ELECTROCARDIOGRAM COMPLETE: CPT

## 2023-12-14 NOTE — ASSESSMENT
[FreeTextEntry1] : ======================================================================================= 1. Complete heart block: likely secondary to cardiac amyloidosis: s/p urgent PPM implantation (03/29/21), s/p upgrade to CRT-D (11/02/21):  - follow up with heart rhythm specialist, John Camilo MD and device clinic at [facility] in [place]  2. Persistent atrial fibrillation: likely secondary to cardiac amyloidosis:  - continue systemic anticoagulation with Eliquis 5mg po bid  - discussed with patient avoidance of ASA and NSAIDS as well as need for bleeding surveillance  - follow up with heart rhythm specialist, John Camilo MD and device clinic at [facility] in [place]  3. Chronic systolic heart failure, secondary to nonischemic cardiomyopathy, cardiac amyloidosis: NYHA II:  - continue Entresto 24/26mg po bid (up titrate as SBP allows)  - continue carvedilol 6.25mg po bid (not able to tolerate 12.5mg po bid secondary to ATTR)  - continue eplerenone 25mg po qd (had mastalgia on spironolactone)  - continue SGLT2 inhibition with Farxiga 5mg po qd (discussed importance of proper urinary hygiene)  - increase furosemide from 20mg to 40mg po qd prn edema  - d/w patient importance of maintaining a weight log, limiting excessive dietary sodium, and using prn loop diuretics  4. CAD: nonobstructive: CCS 0:  - will try to obtain prior invasive coronary angiogram report  - continue off antiplatelet agents given need for systemic anticoagulation  5. Cardiac amyloidosis: ATTRwt-CM, 2 genetic variants of undetermined significance (1 autosomal recessive HFE, 1 autosomal dominant ANK2):  - follow up periodically with cardiogenomics team to discuss significance of 2 variants of undetermined significance  - continue tetramer stabilizing agent, Vyndaquel 20mg 4 tabs po qd (Vyndamax not approved)  - will consider adding other agents when approved for cardiac amyloidosis or if diagnosed with amyloid neuropathy  - will follow with serial echocardiograms  - will follow with serial serum biomarkers (prealbumin, BNP, creatinine) (ordered today)   Disease Progression Parameters: Date.............Prealbumin...............................NT-proBNP................LV GLS TTE...................... 12/14/23..............................................................................................-9.8% 01/2023..................initiated Vyndaquel............................................................................ 12/12/22............................................................................................-14.4%................... 06/08/22.....25......................................................[id number].................................................... 12/23/21............................................................................................-14.2%....................  6. Polyneuropathy: hands: no indication for TTR silencer (patisiran or inotersen) at this time given wild type:  - follow up with neurologist, Nathen Mehta MD  7. CKD IV: Cr 1.7, eGFR 40 (05/24/23): ? secondary to amyloidosis:   - follow up with nephrologist, will see a new nephrologist  - check lab work today  I spent > 45 minutes of total time on this visit, including time spent face-to-face and non-face-to-face.  During this time, I took a relevant history and examined the patient.  I reviewed relevant portions of the medical record and formulated a differential diagnosis and plan.  I explained the relevant cardiac diagnoses to the patient, as well as the work up and management plan.  I answered all questions related to the patient's cardiac conditions.

## 2023-12-14 NOTE — HISTORY OF PRESENT ILLNESS
[FreeTextEntry1] : Mr. Broussard presents for follow up and management of chronic systolic heart failure secondary to nonischemic cardiomyopathy, nonobstructive CAD, complete heart block with urgent PPM implantation (03/29/21), s/p upgrade to CRT-D (11/02/21), persistent atrial fibrillation, cardiac amyloidosis (aTTRwt-CM), CKD IV, DM2, and "rheumatoid arthritis".  He was referred to me for the management of cardiac amyloidosis.  He was being treated for chronic systolic heart failure secondary to nonischemic cardiomyopathy.  Based on an echocardiogram, his cardiologist sent him for a cardiac MRI to rule out an infiltrative cardiomyopathy.  The cardiac MRI was consistent with an infiltrative cardiomyopathy (likely amyloidosis).  He had a normal SPEP and UPEP (HARPREET not performed) and was then sent for a TC-99m PYP scan which was markedly positive for ATTR-cardiac amyloidosis.  In March, 2021, while on vacation in [place], [place], he had progression of cardiac conduction disease and underwent urgent implantation of a dual chamber pacemaker with Jose Alejandro Alvarado MD.  On 11/02/21, he underwent upgrade to a CRT-D device with John Camilo MD of [facility].  We had an extensive discussion about the pathophysiology, natural progression, and treatment of ATTR-cardiac amyloidosis.   His atrial fibrillation is persistent.  Today, 12/14/23, he had an echocardiogram which revealed an EF of 38%, reduced LV GLS -8.9%, moderate symmetrically increased LV wall thickness, grade II diastolic dysfunction, mildly reduced RV function, mildly increased RV wall thickness, moderately dilated LA, aortic sclerosis, mild MR, mild TR, and CRT leads.

## 2023-12-14 NOTE — REASON FOR VISIT
[FreeTextEntry1] : ======================================================================================= ======================================================================================= Diagnostic Tests: ------------------------------------------ EC23: atrial fibrillation, complete AV block, Bi-V paced, PVC.  10/10/22: A-paced, Bi-V paced.  22: A-paced, Bi-V paced.  21: A-paced, Bi-V paced.  ------------------------------------------ Echo: 23: EF 38%, reduced LV GLS -8.9%, moderate symmetrically increased LV wall thickness, grade II diastolic dysfunction, mildly reduced RV function, mildly increased RV wall thickness, moderately dilated LA, aortic sclerosis, mild MR, mild TR, CRT leads.   22: EF 34%, reduced LV GLS -14.4%, moderate symmetrically increased LV wall thickness, grade II diastolic dysfunction, mildly reduced RV function, mildly increased RV wall thickness, mildly dilated LA, aortic sclerosis, mild-to-moderate TR, CRT leads.   21: EF 33%, reduced LV GLS -14.2% (a/b+m 1.7), mild symmetrically increased LV wall thickness, grade II diastolic dysfunction, mildly reduced RV function, mildly dilated LA, aortic sclerosis, mild AI, mild TR, CRT leads.   21: EF 35%, moderately increased LV wall thickness (1.7/1.6), grade II diastolic dysfunction, LV GLS -11% with relative apical sparring, mildly dilated RV, reduced RV function, increase RV wall thickness, markedly dilated LA, moderately dilated RA, mild MR, aortic root 3.7 cm, device leads.   ------------------------------------------ EP procedures: 21: upgrade to CRT-D.  21: CRT-P placement: Medtronic.  ------------------------------------------ Nuclear Med: 21: TC-99 PYP scan: grade 3/3 myocardial uptake.

## 2023-12-15 ENCOUNTER — NON-APPOINTMENT (OUTPATIENT)
Age: 83
End: 2023-12-15

## 2023-12-15 LAB
ALBUMIN SERPL ELPH-MCNC: 4.6 G/DL
ALP BLD-CCNC: 71 U/L
ALT SERPL-CCNC: 15 U/L
ANION GAP SERPL CALC-SCNC: 13 MMOL/L
AST SERPL-CCNC: 25 U/L
BASOPHILS # BLD AUTO: 0.06 K/UL
BASOPHILS NFR BLD AUTO: 0.9 %
BILIRUB SERPL-MCNC: 1 MG/DL
BUN SERPL-MCNC: 44 MG/DL
CALCIUM SERPL-MCNC: 9.9 MG/DL
CHLORIDE SERPL-SCNC: 101 MMOL/L
CHOLEST SERPL-MCNC: 96 MG/DL
CO2 SERPL-SCNC: 28 MMOL/L
CREAT SERPL-MCNC: 1.55 MG/DL
EGFR: 45 ML/MIN/1.73M2
EOSINOPHIL # BLD AUTO: 0.35 K/UL
EOSINOPHIL NFR BLD AUTO: 5.1 %
ESTIMATED AVERAGE GLUCOSE: 128 MG/DL
GLUCOSE SERPL-MCNC: 137 MG/DL
HBA1C MFR BLD HPLC: 6.1 %
HCT VFR BLD CALC: 44.6 %
HDLC SERPL-MCNC: 59 MG/DL
HGB BLD-MCNC: 14.6 G/DL
IMM GRANULOCYTES NFR BLD AUTO: 0.1 %
LDLC SERPL DIRECT ASSAY-MCNC: 24 MG/DL
LYMPHOCYTES # BLD AUTO: 1.32 K/UL
LYMPHOCYTES NFR BLD AUTO: 19.2 %
MAN DIFF?: NORMAL
MCHC RBC-ENTMCNC: 32.7 GM/DL
MCHC RBC-ENTMCNC: 35.3 PG
MCV RBC AUTO: 107.7 FL
MONOCYTES # BLD AUTO: 1.14 K/UL
MONOCYTES NFR BLD AUTO: 16.6 %
NEUTROPHILS # BLD AUTO: 3.98 K/UL
NEUTROPHILS NFR BLD AUTO: 58.1 %
NT-PROBNP SERPL-MCNC: 5795 PG/ML
PLATELET # BLD AUTO: 132 K/UL
POTASSIUM SERPL-SCNC: 4.9 MMOL/L
PREALB SERPL NEPH-MCNC: 23 MG/DL
PROT SERPL-MCNC: 7.7 G/DL
RBC # BLD: 4.14 M/UL
RBC # FLD: 13.4 %
SODIUM SERPL-SCNC: 143 MMOL/L
TRIGL SERPL-MCNC: 80 MG/DL
TSH SERPL-ACNC: 3.07 UIU/ML
WBC # FLD AUTO: 6.86 K/UL

## 2024-01-30 ENCOUNTER — RX RENEWAL (OUTPATIENT)
Age: 84
End: 2024-01-30

## 2024-01-30 RX ORDER — TAFAMIDIS MEGLUMINE 20 MG/1
20 CAPSULE, LIQUID FILLED ORAL DAILY
Qty: 360 | Refills: 3 | Status: ACTIVE | COMMUNITY
Start: 2023-02-03 | End: 1900-01-01

## 2024-04-18 ENCOUNTER — RESULT REVIEW (OUTPATIENT)
Age: 84
End: 2024-04-18

## 2024-04-18 ENCOUNTER — APPOINTMENT (OUTPATIENT)
Dept: HEART AND VASCULAR | Facility: CLINIC | Age: 84
End: 2024-04-18

## 2024-04-19 ENCOUNTER — TRANSCRIPTION ENCOUNTER (OUTPATIENT)
Age: 84
End: 2024-04-19

## 2024-06-10 ENCOUNTER — APPOINTMENT (OUTPATIENT)
Dept: HEART AND VASCULAR | Facility: CLINIC | Age: 84
End: 2024-06-10
Payer: MEDICARE

## 2024-06-10 ENCOUNTER — LABORATORY RESULT (OUTPATIENT)
Age: 84
End: 2024-06-10

## 2024-06-10 VITALS
OXYGEN SATURATION: 96 % | BODY MASS INDEX: 28.72 KG/M2 | HEIGHT: 67 IN | TEMPERATURE: 97.9 F | HEART RATE: 92 BPM | SYSTOLIC BLOOD PRESSURE: 107 MMHG | DIASTOLIC BLOOD PRESSURE: 69 MMHG | WEIGHT: 183 LBS

## 2024-06-10 DIAGNOSIS — I50.22 CHRONIC SYSTOLIC (CONGESTIVE) HEART FAILURE: ICD-10-CM

## 2024-06-10 DIAGNOSIS — E78.5 HYPERLIPIDEMIA, UNSPECIFIED: ICD-10-CM

## 2024-06-10 DIAGNOSIS — I48.91 UNSPECIFIED ATRIAL FIBRILLATION: ICD-10-CM

## 2024-06-10 DIAGNOSIS — I43 ORGAN-LIMITED AMYLOIDOSIS: ICD-10-CM

## 2024-06-10 DIAGNOSIS — Z95.0 PRESENCE OF CARDIAC PACEMAKER: ICD-10-CM

## 2024-06-10 DIAGNOSIS — N18.30 CHRONIC KIDNEY DISEASE, STAGE 3 UNSPECIFIED: ICD-10-CM

## 2024-06-10 DIAGNOSIS — I45.9 CONDUCTION DISORDER, UNSPECIFIED: ICD-10-CM

## 2024-06-10 DIAGNOSIS — I42.8 OTHER CARDIOMYOPATHIES: ICD-10-CM

## 2024-06-10 DIAGNOSIS — E11.9 TYPE 2 DIABETES MELLITUS W/OUT COMPLICATIONS: ICD-10-CM

## 2024-06-10 DIAGNOSIS — E85.4 ORGAN-LIMITED AMYLOIDOSIS: ICD-10-CM

## 2024-06-10 PROCEDURE — G2211 COMPLEX E/M VISIT ADD ON: CPT

## 2024-06-10 PROCEDURE — 99215 OFFICE O/P EST HI 40 MIN: CPT

## 2024-06-10 PROCEDURE — 93000 ELECTROCARDIOGRAM COMPLETE: CPT

## 2024-06-10 RX ORDER — APIXABAN 5 MG/1
5 TABLET, FILM COATED ORAL
Qty: 180 | Refills: 3 | Status: ACTIVE | COMMUNITY
Start: 1900-01-01 | End: 1900-01-01

## 2024-06-10 RX ORDER — ATORVASTATIN CALCIUM 40 MG/1
40 TABLET, FILM COATED ORAL DAILY
Qty: 90 | Refills: 3 | Status: ACTIVE | COMMUNITY
Start: 2024-04-22 | End: 1900-01-01

## 2024-06-10 RX ORDER — SACUBITRIL AND VALSARTAN 24; 26 MG/1; MG/1
24-26 TABLET, FILM COATED ORAL TWICE DAILY
Qty: 180 | Refills: 3 | Status: ACTIVE | COMMUNITY
Start: 1900-01-01 | End: 1900-01-01

## 2024-06-10 RX ORDER — PANTOPRAZOLE 20 MG/1
20 TABLET, DELAYED RELEASE ORAL
Qty: 90 | Refills: 0 | Status: DISCONTINUED | COMMUNITY
Start: 2022-06-13 | End: 2024-06-10

## 2024-06-10 RX ORDER — FUROSEMIDE 40 MG/1
40 TABLET ORAL DAILY
Qty: 90 | Refills: 3 | Status: DISCONTINUED | COMMUNITY
End: 2024-06-10

## 2024-06-10 RX ORDER — DAPAGLIFLOZIN 10 MG/1
10 TABLET, FILM COATED ORAL DAILY
Qty: 90 | Refills: 3 | Status: ACTIVE | COMMUNITY
Start: 1900-01-01 | End: 1900-01-01

## 2024-06-10 RX ORDER — SIMVASTATIN 10 MG/1
10 TABLET, FILM COATED ORAL
Qty: 90 | Refills: 3 | Status: DISCONTINUED | COMMUNITY
End: 2024-06-10

## 2024-06-10 RX ORDER — FUROSEMIDE 20 MG/1
20 TABLET ORAL
Qty: 90 | Refills: 0 | Status: ACTIVE | COMMUNITY
Start: 2024-06-03

## 2024-06-10 RX ORDER — CARVEDILOL 6.25 MG/1
6.25 TABLET, FILM COATED ORAL TWICE DAILY
Qty: 180 | Refills: 3 | Status: ACTIVE | COMMUNITY
Start: 1900-01-01 | End: 1900-01-01

## 2024-06-10 RX ORDER — EPLERENONE 25 MG/1
25 TABLET, COATED ORAL
Qty: 45 | Refills: 3 | Status: ACTIVE | COMMUNITY
Start: 1900-01-01 | End: 1900-01-01

## 2024-06-10 NOTE — REASON FOR VISIT
[FreeTextEntry1] : ======================================================================================= Diagnostic Tests: -------------------------------------------------------------- EC/10/24: atrial fibrillation, complete AV block, Bi-V paced, +PVCs. 23: atrial fibrillation, complete AV block, Bi-V paced, PVC.  10/10/22: A-paced, Bi-V paced.  22: A-paced, Bi-V paced.  21: A-paced, Bi-V paced.  -------------------------------------------------------------- Echo: 24: EF 35-40%, grade II diastolic dysfunction, mild "LVH", aortic sclerosis, aortic sclerosis, mild AI, mild-to-moderate MR, mild TR, PASP 36 mmHg.  23: EF 38%, reduced LV GLS -8.9%, moderate symmetrically increased LV wall thickness, grade II diastolic dysfunction, mildly reduced RV function, mildly increased RV wall thickness, moderately dilated LA, aortic sclerosis, mild MR, mild TR, CRT leads.   22: EF 34%, reduced LV GLS -14.4%, moderate symmetrically increased LV wall thickness, grade II diastolic dysfunction, mildly reduced RV function, mildly increased RV wall thickness, mildly dilated LA, aortic sclerosis, mild-to-moderate TR, CRT leads.   21: EF 33%, reduced LV GLS -14.2% (a/b+m 1.7), mild symmetrically increased LV wall thickness, grade II diastolic dysfunction, mildly reduced RV function, mildly dilated LA, aortic sclerosis, mild AI, mild TR, CRT leads.   21: EF 35%, moderately increased LV wall thickness (1.7/1.6), grade II diastolic dysfunction, LV GLS -11% with relative apical sparring, mildly dilated RV, reduced RV function, increase RV wall thickness, markedly dilated LA, moderately dilated RA, mild MR, aortic root 3.7 cm, device leads.   -------------------------------------------------------------- EP procedures: 21: upgrade to CRT-D.  21: CRT-P placement: Medtronic.  -------------------------------------------------------------- Nuclear Med: 21: TC-99 PYP scan: grade 3/3 myocardial uptake.  -------------------------------------------------------------- CT: 24: CCTA: , mild diffuse CAD.

## 2024-06-10 NOTE — ASSESSMENT
[FreeTextEntry1] : ======================================================================================= 1. 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 Persistent atrial fibrillation: likely secondary to cardiac amyloidosis:  - continue systemic anticoagulation with Eliquis 5mg po bid  - discussed with patient avoidance of ASA and NSAIDS as well as need for bleeding surveillance  - follow up with heart rhythm specialist, John Camilo MD and device clinic at [facility]  3. Chronic systolic heart failure, secondary to nonischemic cardiomyopathy, cardiac amyloidosis: NYHA II:  - continue Entresto 24/26mg po bid (up titrate as SBP allows)  - continue carvedilol 6.25mg po bid (not able to tolerate 12.5mg po bid secondary to ATTR)  - continue eplerenone 25mg po qd (had mastalgia on spironolactone)  - increase SGLT2 inhibition Farxiga to 10mg po qd (discussed importance of proper urinary hygiene)  - increase furosemide to 40mg po qd prn edema  - d/w patient importance of maintaining a weight log, limiting excessive dietary sodium, and using prn loop diuretics  4. CAD: mild:   - continue off antiplatelet agents given need for systemic anticoagulation  5. 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LV GLS TTE 05/21/24.................................  1.4................................................................ 12/14/23.....23............................1.55.................5,795.........................-9.8% 01/2023..................initiated Vyndaquel.......................................................... 12/12/22............................................................................................-14.4% 06/08/22.....25....................................................2,856.................................. 12/23/21............................................................................................-14.2%  6. Polyneuropathy: hands: no indication for TTR silencer (patisiran or inotersen) at this time given wild type:  - follow up with neurologist, Nathen Mehta MD  7. CKD IV: ? secondary to amyloidosis: Cr 1.4, eGFR 50 (05/21/24):   - follow up with nephrologist, will see a new nephrologist  - check lab work today  I spent > 45 minutes of total time on this visit, including time spent face-to-face and non-face-to-face.  During this time, I took a relevant history and examined the patient.  I reviewed relevant portions of the medical record and formulated a differential diagnosis and plan.  I explained the relevant cardiac diagnoses to the patient, as well as the work up and management plan.  I answered all questions related to the patient's cardiac conditions.

## 2024-06-10 NOTE — HISTORY OF PRESENT ILLNESS
[FreeTextEntry1] : Mr. Broussard presents for follow up and management of chronic systolic heart failure secondary to nonischemic cardiomyopathy, nonobstructive CAD, complete heart block with urgent PPM implantation (03/29/21), s/p upgrade to CRT-D (11/02/21), persistent atrial fibrillation, cardiac amyloidosis (aTTRwt-CM), CKD IV, DM2, and "rheumatoid arthritis".  He was referred to me for the management of cardiac amyloidosis.  He was being treated for chronic systolic heart failure secondary to nonischemic cardiomyopathy.  Based on an echocardiogram, his cardiologist sent him for a cardiac MRI to rule out an infiltrative cardiomyopathy.  The cardiac MRI was consistent with an infiltrative cardiomyopathy (likely amyloidosis).  He had a normal SPEP and UPEP (HARPREET not performed) and was then sent for a TC-99m PYP scan which was markedly positive for ATTR-cardiac amyloidosis.  In March, 2021, while on vacation in [place], [place], he had progression of cardiac conduction disease and underwent urgent implantation of a dual chamber pacemaker with Jose Alejandro Alvarado MD.  On 11/02/21, he underwent upgrade to a CRT-D device with John Camilo MD of [facility].  We had an extensive discussion about the pathophysiology, natural progression, and treatment of ATTR-cardiac amyloidosis.   His atrial fibrillation is persistent.  On 12/14/23, he had an echocardiogram which revealed an EF of 38%, reduced LV GLS -8.9%, moderate symmetrically increased LV wall thickness, grade II diastolic dysfunction, mildly reduced RV function, mildly increased RV wall thickness, moderately dilated LA, aortic sclerosis, mild MR, mild TR, and CRT leads.  On 04/18/24, he was admitted to [facility] with atypical chest pain and had an echocardiogram and CCTA which revealed an EF of 35-40%, grade II diastolic dysfunction, mild "LVH", aortic sclerosis, aortic sclerosis, mild AI, mild-to-moderate MR, mild TR, PASP 36 mmHg, and mild diffuse CAD.   At present, he feels well.

## 2024-06-11 ENCOUNTER — NON-APPOINTMENT (OUTPATIENT)
Age: 84
End: 2024-06-11

## 2024-06-11 LAB
ALBUMIN SERPL ELPH-MCNC: 4.6 G/DL
ALP BLD-CCNC: 79 U/L
ALT SERPL-CCNC: 14 U/L
ANION GAP SERPL CALC-SCNC: 16 MMOL/L
AST SERPL-CCNC: 21 U/L
BILIRUB SERPL-MCNC: 0.8 MG/DL
BUN SERPL-MCNC: 47 MG/DL
CALCIUM SERPL-MCNC: 10.1 MG/DL
CHLORIDE SERPL-SCNC: 101 MMOL/L
CHOLEST SERPL-MCNC: 101 MG/DL
CO2 SERPL-SCNC: 23 MMOL/L
CREAT SERPL-MCNC: 1.63 MG/DL
EGFR: 42 ML/MIN/1.73M2
ESTIMATED AVERAGE GLUCOSE: 148 MG/DL
GLUCOSE SERPL-MCNC: 178 MG/DL
HBA1C MFR BLD HPLC: 6.8 %
HDLC SERPL-MCNC: 61 MG/DL
LDLC SERPL DIRECT ASSAY-MCNC: 24 MG/DL
NT-PROBNP SERPL-MCNC: 2804 PG/ML
POTASSIUM SERPL-SCNC: 5.1 MMOL/L
PREALB SERPL NEPH-MCNC: 23 MG/DL
PROT SERPL-MCNC: 8 G/DL
SODIUM SERPL-SCNC: 140 MMOL/L
TRIGL SERPL-MCNC: 72 MG/DL
TSH SERPL-ACNC: 2.64 UIU/ML

## 2024-06-11 NOTE — REVIEW OF SYSTEMS
"    [address] [facility]: [phone]                                              INTERAGENCY TRANSFER FORM - PHYSICIAN ORDERS   2017                    Hospital Admission Date: 2017  MANA BRISENO   : 1952  Sex: Female        Attending Provider: Serena Cole MD     Allergies:  Sulfa Drugs, Wasps [Hornets]    Infection:  None   Service:  GYN/ONC    Ht:  1.613 m (5' 3.5\")   Wt:  101.5 kg (223 lb 11.2 oz)   Admission Wt:  95.6 kg (210 lb 12.8 oz)    BMI:  39.01 kg/m 2   BSA:  2.13 m 2            Patient PCP Information     Provider PCP Type    Morelia Ayon NP General      ED Clinical Impression     Diagnosis Description Comment Added By Time Added    Cellulitis and abscess of leg [L03.119, L02.419] Cellulitis and abscess of leg [L03.119, L02.419]  Thelma Lopez MD 7/3/2017 12:18 AM    Pelvic mass [R19.00] Pelvic mass [R19.00]  Thelma Lopez MD 7/3/2017 12:18 AM      Hospital Problems as of 2017              Priority Class Noted POA    Pelvic mass in female Medium  7/3/2017 Yes    S/P laparotomy Medium  2017 Yes      Non-Hospital Problems as of 2017              Priority Class Noted    Attention deficit disorder   2005    PANIC DISORDER    2005    VASOMOTOR RHINITIS   2005    Chronic Maxillary Sinusitis   2008    Tachycardia   Unknown    Type 2 diabetes mellitus without complication (H)   10/31/2010    HYPERLIPIDEMIA LDL GOAL <100   10/31/2010    Allergic rhinitis due to other allergen   2011    BMI > 35 Medium  10/14/2015    Essential hypertension Medium  2016    Lumbago Medium  2016    Major depressive disorder, recurrent episode, mild (H) Medium  2016    Long-term (current) use of anticoagulants [Z79.01] Medium  2017    Deep vein thrombosis (DVT) (H) [I82.409] Medium  2017      Code Status History     Date Active Date Inactive Code Status Order ID Comments User Context    2017  9:01 AM  Full Code " [id number]  Concepcion Kohler APRN CNP Outpatient    7/7/2017  8:46 PM 7/11/2017  9:01 AM Full Code [id number]  Danielle Barrios MD Inpatient    7/3/2017  3:25 AM 7/7/2017  8:46 PM Full Code [id number]  Yolande Shankar MD Inpatient         Medication Review      START taking        Dose / Directions Comments    acetaminophen 325 MG tablet   Commonly known as:  TYLENOL   Used for:  Cellulitis and abscess of leg, Mass of pelvis        Dose:  650 mg   Take 2 tablets (650 mg) by mouth every 6 hours as needed for mild pain   Quantity:  100 tablet   Refills:  0        HYDROmorphone 4 MG tablet   Commonly known as:  DILAUDID   Used for:  Mass of pelvis, Cellulitis and abscess of leg        Dose:  4-6 mg   Take 1-1.5 tablets (4-6 mg) by mouth every 3 hours as needed for moderate to severe pain   Quantity:  50 tablet   Refills:  0        polyethylene glycol Packet   Commonly known as:  MIRALAX/GLYCOLAX   Used for:  Mass of pelvis        Dose:  17 g   Take 17 g by mouth daily   Quantity:  7 packet   Refills:  0        senna-docusate 8.6-50 MG per tablet   Commonly known as:  SENOKOT-S;PERICOLACE   Used for:  Mass of pelvis        Dose:  2 tablet   Take 2 tablets by mouth 2 times daily Start with 1 tablet PO BID, If no bowel movement in 24 hours, increase to 2 tablets PO BID.  Hold for loose stools.   Quantity:  100 tablet   Refills:  0          CONTINUE these medications which have NOT CHANGED        Dose / Directions Comments    albuterol 108 (90 BASE) MCG/ACT Inhaler   Commonly known as:  albuterol   Used for:  Cough        Dose:  2 puff   Inhale 2 puffs into the lungs 4 times daily as needed for shortness of breath / dyspnea   Quantity:  1 Inhaler   Refills:  PRN        atorvastatin 80 MG tablet   Commonly known as:  LIPITOR   Used for:  Hyperlipidemia LDL goal <100        Dose:  80 mg   Take 1 tablet (80 mg) by mouth daily   Quantity:  90 tablet   Refills:  PRN        CALCIUM-MAGNESIUM-VITAMIN D PO        Dose:  2  tablet   Take 2 tablets by mouth daily   Refills:  0        DIGESTIVE ENZYME PO        Dose:  1 capsule   Take 1 capsule by mouth as needed   Refills:  0        EPINEPHrine 0.3 MG/0.3ML injection   Used for:  Bee allergy status        Dose:  0.3 mg   Inject 0.3 mLs (0.3 mg) into the muscle once as needed for anaphylaxis   Quantity:  1 each   Refills:  PRN        escitalopram 20 MG tablet   Commonly known as:  LEXAPRO   Used for:  Major depressive disorder, recurrent episode, mild (H)        Dose:  20 mg   Take 1 tablet (20 mg) by mouth daily   Quantity:  30 tablet   Refills:  PRN        fluticasone 50 MCG/ACT spray   Commonly known as:  FLONASE   Used for:  Chronic maxillary sinusitis        Dose:  2 spray   Spray 2 sprays into both nostrils daily   Quantity:  48 g   Refills:  1        lisinopril 10 MG tablet   Commonly known as:  PRINIVIL/ZESTRIL   Used for:  Essential hypertension        Dose:  10 mg   Take 1 tablet (10 mg) by mouth daily   Quantity:  90 tablet   Refills:  PRN    Keep on file       metFORMIN 500 MG 24 hr tablet   Commonly known as:  GLUCOPHAGE-XR   Used for:  Type 2 diabetes mellitus without complication, without long-term current use of insulin (H)        Take two tabs by mouth once daily with evening meal.   Quantity:  180 tablet   Refills:  PRN    Keep on file       MULTIVITAMIN ADULT PO        Dose:  1 tablet   Take 1 tablet by mouth daily   Refills:  0        order for DME   Used for:  Essential hypertension        Equipment being ordered: blood pressure monitor   Quantity:  1 Units   Refills:  0        order for DME   Used for:  Long-term (current) use of anticoagulants, Acute deep vein thrombosis (DVT) of left lower extremity, unspecified vein (H)        Compression stockings 20-30 mm Hg. To be wear when directed by Hematology team and while awake for the first two years post clot.   Quantity:  1 each   Refills:  0        PRO-BIOTIC BLEND PO        Dose:  1 capsule   Take 1 capsule by mouth  daily Enzymatic Therapy-probiotic pearls   Refills:  0        rivaroxaban ANTICOAGULANT 20 MG Tabs tablet   Commonly known as:  XARELTO   Used for:  Long-term (current) use of anticoagulants, Acute deep vein thrombosis (DVT) of left lower extremity, unspecified vein (H)        Dose:  20 mg   Take 1 tablet (20 mg) by mouth daily (with dinner)   Quantity:  30 tablet   Refills:  3        vitamin B complex with vitamin C Tabs tablet        Dose:  1 tablet   Take 1 tablet by mouth daily   Refills:  0        vitamin D 1000 UNITS capsule        Dose:  2000 Units   Take 2,000 Units by mouth daily   Refills:  0        VYVANSE PO        Dose:  50 mg   Take 50 mg by mouth daily   Refills:  0          STOP taking     traMADol 50 MG tablet   Commonly known as:  ULTRAM                   Summary of Visit     Reason for your hospital stay       Surgery             After Care     Activity - Ambulate in hallway       Every shift       Activity - Up with nursing assistance           Additional Discharge Instructions       Elevate LE       Advance Diet as Tolerated       Follow this diet upon discharge: Regular diabetic diet       Encourage PO fluids           Fall precautions           General info for SNF       Length of Stay Estimate: Short Term Care: Estimated # of Days <30  Condition at Discharge: Stable  Level of care:skilled   Rehabilitation Potential: Good  Admission H&P remains valid and up-to-date: Yes  Recent Chemotherapy: N/A  Use Nursing Home Standing Orders: No       Glucose monitor nursing POCT       Before meals and at bedtime       Intake and output       Every shift       Mantoux instructions       Give two-step Mantoux (PPD) Per Facility Policy Yes       Wound care (specify)       Site:   Abd midline inc  Instructions:  Keep c/d/i. Abd binder to be worn when oob. Monitor for signs of infection. Ok to shower and pat dry. Keep Open to air    Left thigh wound care instructions: Change dressing daily, clean with  "Microklenz, pack wound with 1/4\" Iodoform gauze, cover with Mepilex dressing             Procedures     Oxygen - Nasal cannula       1-2 Lpm by nasal cannula to keep O2 sats 92% or greater.             Referrals     Occupational Therapy Adult Consult       Evaluate and treat as clinically indicated.    Reason:  Weakness post op       Physical Therapy Adult Consult       Evaluate and treat as clinically indicated.    Reason:  Weakness post op             Blood Bank Orders     ABO/Rh Type and Screen                 Your next 10 appointments already scheduled     Jul 31, 2017  3:00 PM CDT   (Arrive by 2:45 PM)   Return Visit with Jarod Shaffer MD   [facility] ([facility])    [address] [place] [address] [id number] [phone]            Sep 15, 2017 12:15 PM CDT   LAB with  LAB   [facility] ([facility])    [address] [place] [address] [id number] [phone]           Patient must bring picture ID.  Patient should be prepared to give a urine specimen  Please do not eat 10-12 hours before your appointment if you are coming in fasting for labs on lipids, cholesterol, or glucose (sugar).  Pregnant women should follow their Care Team instructions. Water with medications is okay. Do not drink coffee or other fluids.   If you have concerns about taking  your medications, please ask at office or if scheduling via [employer], send a message by clicking on Secure Messaging, Message Your Care Team.            Sep 15, 2017 12:30 PM CDT   US LOWER EXTREMITY VENOUS DUPLEX LEFT with [id number]   [facility] ([facility])    [address] [place] [address] [phone] [phone]           Please bring a list of your medicines (including vitamins, minerals and over-the-counter drugs). Also, tell your doctor about any allergies you may have. Wear comfortable " "clothes and leave your valuables at home.  You do not need to do anything special to prepare for your exam.  Please call the Imaging Department at your exam site with any questions.            Sep 15, 2017  1:30 PM CDT   (Arrive by 1:15 PM)   RETURN CLOTTING DISORDER with William Villarreal MD   [facility] ([facility])    [address]  3rd Floor  [facility] [id number] [phone]              Follow-Up Appointment Instructions     Future Labs/Procedures    Follow Up and recommended labs and tests     Comments:    Follow up 7/31/17 Dr Shaffer - call prior to then for questions or concerns [phone].    Left thigh wound care instructions: Change dressing daily, clean with Microklenz, pack wound with 1/4\" Iodoform gauze, cover with Mepilex dressing    Left leg DVT: Continue taking Xarelto as prescribed by your hematologist. Follow up with your hematologist as previously scheduled for further management of your DVT.     GENERAL POST-OPERATIVE  PATIENT INSTRUCTIONS      FOLLOW-UP:    Call Surgeon if you have:  Temperature greater than 100.4  Persistent nausea and vomiting  Severe uncontrolled pain  Redness, tenderness, or signs of infection (pain, swelling, redness, odor or green/yellow discharge around the site)  Difficulty breathing, headache or visual disturbances  Hives  Persistent dizziness or light-headedness  Extreme fatigue  Any other questions or concerns you may have after discharge    In an emergency, call 911 or go to an Emergency Department at a nearby hospital       WOUND CARE INSTRUCTIONS:  Keep a dry clean dressing on the wound if there is drainage. If you had a bandage initially, it may be removed after 24 hours.  Once the wound has quit draining you may leave it open to air.  If clothing rubs against the wound or causes irritation and the wound is not draining you may cover it with a dry dressing during the daytime.  Try to keep the wound " dry and avoid ointments on the wound unless directed to do so.  If the wound becomes bright red and painful or starts to drain infected material that is not clear, please contact your physician immediately.    1.  You may shower 24 hrs after surgery   2.  No soaking in the tub for 4 weeks       DIET:  There are no dietary restrictions.  You may eat any foods that you can tolerate unless instructed otherwise.  It is a good idea to eat a high fiber diet and take in plenty of fluids to prevent constipation.  If you become constipated, please follow the instructions below.    ACTIVITY:  You are encouraged to cough, deep breath and use your incentive spirometer if you were given one, every 15-30 minutes when awake.  This will help prevent respiratory complications and low grade fevers post-operatively.  You may want to hug a pillow when coughing and sneezing to add additional support to the surgical area, if you had abdominal surgery, which will decrease pain during these times.      1.  No heavy lifting >20lbs or strenuous exercise for six-eight weeks.  No exercise in which you are using core muscles (yoga, pilates, swimming, weight lifting)  2.  You may walk as much as you wish.  You are encouraged to increase your activity each day after surgery.  Stairs are okay.   3.  Nothing per vagina for eight weeks.  No tampons, no intercourse, no douching.  You can expect some light vaginal spotting and discharge for up to six weeks.  If bleeding becomes heavy, please contact the office.     MEDICATIONS:  Try to take narcotic medications and anti-inflammatory medications, such as tylenol, ibuprofen, naprosyn, etc., with food.  This will minimize stomach upset from the medication.  Should you develop nausea and vomiting from the pain medication, or develop a rash, please discontinue the medication and contact your physician.  You should not drive, make important decisions, or operate machinery when taking narcotic pain  "medication.    OTHER:  Patients are often constipated after general anesthesia and surgery.  The patient should continue to take stool softeners (for example, Senokot-S) for the next six weeks (unless diarrhea develops) and consume adequate amounts of water.  If the patient remains constipated or unable to pass stool, please try one or all of the following measures:  1.  Milk of Magnesia 30cc twice a day as needed by mouth  2.  Metamucil 2 tablespoons in 12 ounces of fluid  3.  Dulcolax oral or suppositories  4.  Prunes or prune juice  5.  Miralax daily      QUESTIONS:  Please feel free to call your physician or the hospital  if you have any questions, and they will be glad to assist you.      Follow-Up Appointment Instructions     Follow Up and recommended labs and tests       Follow up 7/31/17 Dr Shaffer - call prior to then for questions or concerns [phone].    Left thigh wound care instructions: Change dressing daily, clean with Microklenz, pack wound with 1/4\" Iodoform gauze, cover with Mepilex dressing    Left leg DVT: Continue taking Xarelto as prescribed by your hematologist. Follow up with your hematologist as previously scheduled for further management of your DVT.     GENERAL POST-OPERATIVE  PATIENT INSTRUCTIONS      FOLLOW-UP:    Call Surgeon if you have:  Temperature greater than 100.4  Persistent nausea and vomiting  Severe uncontrolled pain  Redness, tenderness, or signs of infection (pain, swelling, redness, odor or green/yellow discharge around the site)  Difficulty breathing, headache or visual disturbances  Hives  Persistent dizziness or light-headedness  Extreme fatigue  Any other questions or concerns you may have after discharge    In an emergency, call 911 or go to an Emergency Department at a nearby hospital       WOUND CARE INSTRUCTIONS:  Keep a dry clean dressing on the wound if there is drainage. If you had a bandage initially, it may be removed after 24 hours.  Once the " wound has quit draining you may leave it open to air.  If clothing rubs against the wound or causes irritation and the wound is not draining you may cover it with a dry dressing during the daytime.  Try to keep the wound dry and avoid ointments on the wound unless directed to do so.  If the wound becomes bright red and painful or starts to drain infected material that is not clear, please contact your physician immediately.    1.  You may shower 24 hrs after surgery   2.  No soaking in the tub for 4 weeks       DIET:  There are no dietary restrictions.  You may eat any foods that you can tolerate unless instructed otherwise.  It is a good idea to eat a high fiber diet and take in plenty of fluids to prevent constipation.  If you become constipated, please follow the instructions below.    ACTIVITY:  You are encouraged to cough, deep breath and use your incentive spirometer if you were given one, every 15-30 minutes when awake.  This will help prevent respiratory complications and low grade fevers post-operatively.  You may want to hug a pillow when coughing and sneezing to add additional support to the surgical area, if you had abdominal surgery, which will decrease pain during these times.      1.  No heavy lifting >20lbs or strenuous exercise for six-eight weeks.  No exercise in which you are using core muscles (yoga, pilates, swimming, weight lifting)  2.  You may walk as much as you wish.  You are encouraged to increase your activity each day after surgery.  Stairs are okay.   3.  Nothing per vagina for eight weeks.  No tampons, no intercourse, no douching.  You can expect some light vaginal spotting and discharge for up to six weeks.  If bleeding becomes heavy, please contact the office.     MEDICATIONS:  Try to take narcotic medications and anti-inflammatory medications, such as tylenol, ibuprofen, naprosyn, etc., with food.  This will minimize stomach upset from the medication.  Should you develop nausea and  vomiting from the pain medication, or develop a rash, please discontinue the medication and contact your physician.  You should not drive, make important decisions, or operate machinery when taking narcotic pain medication.    OTHER:  Patients are often constipated after general anesthesia and surgery.  The patient should continue to take stool softeners (for example, Senokot-S) for the next six weeks (unless diarrhea develops) and consume adequate amounts of water.  If the patient remains constipated or unable to pass stool, please try one or all of the following measures:  1.  Milk of Magnesia 30cc twice a day as needed by mouth  2.  Metamucil 2 tablespoons in 12 ounces of fluid  3.  Dulcolax oral or suppositories  4.  Prunes or prune juice  5.  Miralax daily      QUESTIONS:  Please feel free to call your physician or the hospital  if you have any questions, and they will be glad to assist you.             Statement of Approval     Ordered          07/12/17 1124  I have reviewed and agree with all the recommendations and orders detailed in this document.  EFFECTIVE NOW     Approved and electronically signed by:  Corry Segura MD                [Dyspnea on exertion] : dyspnea during exertion [Joint Pain] : joint pain [Tingling (Paresthesia)] : tingling [Negative] : Heme/Lymph

## 2024-06-11 NOTE — REASON FOR VISIT
[Other: ____] : [unfilled] [FreeTextEntry1] : ======================================================================================= Diagnostic Tests: -------------------------------------------------------------- EC/10/24: atrial fibrillation, complete AV block, Bi-V paced, +PVCs. 23: atrial fibrillation, complete AV block, Bi-V paced, PVC.  10/10/22: A-paced, Bi-V paced.  22: A-paced, Bi-V paced.  21: A-paced, Bi-V paced.  -------------------------------------------------------------- Echo: 24: EF 35-40%, grade II diastolic dysfunction, mild "LVH", aortic sclerosis, aortic sclerosis, mild AI, mild-to-moderate MR, mild TR, PASP 36 mmHg.  23: EF 38%, reduced LV GLS -8.9%, moderate symmetrically increased LV wall thickness, grade II diastolic dysfunction, mildly reduced RV function, mildly increased RV wall thickness, moderately dilated LA, aortic sclerosis, mild MR, mild TR, CRT leads.   22: EF 34%, reduced LV GLS -14.4%, moderate symmetrically increased LV wall thickness, grade II diastolic dysfunction, mildly reduced RV function, mildly increased RV wall thickness, mildly dilated LA, aortic sclerosis, mild-to-moderate TR, CRT leads.   21: EF 33%, reduced LV GLS -14.2% (a/b+m 1.7), mild symmetrically increased LV wall thickness, grade II diastolic dysfunction, mildly reduced RV function, mildly dilated LA, aortic sclerosis, mild AI, mild TR, CRT leads.   21: EF 35%, moderately increased LV wall thickness (1.7/1.6), grade II diastolic dysfunction, LV GLS -11% with relative apical sparring, mildly dilated RV, reduced RV function, increase RV wall thickness, markedly dilated LA, moderately dilated RA, mild MR, aortic root 3.7 cm, device leads.   -------------------------------------------------------------- EP procedures: 21: upgrade to CRT-D.  21: CRT-P placement: [employer].  -------------------------------------------------------------- Nuclear Med: 21: TC-99 PYP scan: grade 3/3 myocardial uptake.  -------------------------------------------------------------- CT: 24: CCTA: , mild diffuse CAD.

## 2024-06-11 NOTE — PHYSICAL EXAM
[Well Developed] : well developed [Well Nourished] : well nourished [No Acute Distress] : no acute distress [Normal Conjunctiva] : normal conjunctiva [Normal Venous Pressure] : normal venous pressure [No Carotid Bruit] : no carotid bruit [Normal S1, S2] : normal S1, S2 [No Murmur] : no murmur [No Rub] : no rub [No Gallop] : no gallop [Clear Lung Fields] : clear lung fields [Good Air Entry] : good air entry [No Respiratory Distress] : no respiratory distress  [Soft] : abdomen soft [Non Tender] : non-tender [No Masses/organomegaly] : no masses/organomegaly [Normal Bowel Sounds] : normal bowel sounds [Normal Gait] : normal gait [No Edema] : no edema [No Cyanosis] : no cyanosis [No Clubbing] : no clubbing [No Varicosities] : no varicosities [No Rash] : no rash [No Skin Lesions] : no skin lesions [Moves all extremities] : moves all extremities [No Focal Deficits] : no focal deficits [Normal Speech] : normal speech [Alert and Oriented] : alert and oriented [Normal memory] : normal memory Xenograft Text: The defect edges were debeveled with a #15 scalpel blade.  Given the location of the defect, shape of the defect and the proximity to free margins a xenograft was deemed most appropriate.  The graft was then trimmed to fit the size of the defect.  The graft was then placed in the primary defect and oriented appropriately.

## 2024-06-11 NOTE — HISTORY OF PRESENT ILLNESS
[FreeTextEntry1] : Mr. Broussard presents for follow up and management of chronic systolic heart failure secondary to nonischemic cardiomyopathy, nonobstructive CAD, complete heart block with urgent PPM implantation (03/29/21), s/p upgrade to CRT-D (11/02/21), persistent atrial fibrillation, cardiac amyloidosis (aTTRwt-CM), CKD IV, DM2, and "rheumatoid arthritis".  He was referred to me for the management of cardiac amyloidosis.  He was being treated for chronic systolic heart failure secondary to nonischemic cardiomyopathy.  Based on an echocardiogram, his cardiologist sent him for a cardiac MRI to rule out an infiltrative cardiomyopathy.  The cardiac MRI was consistent with an infiltrative cardiomyopathy (likely amyloidosis).  He had a normal SPEP and UPEP (HARPREET not performed) and was then sent for a TC-99m PYP scan which was markedly positive for ATTR-cardiac amyloidosis.  In March, 2021, while on vacation in [place], [place], he had progression of cardiac conduction disease and underwent urgent implantation of a dual chamber pacemaker with Jose Alejandro Alvarado MD.  On 11/02/21, he underwent upgrade to a CRT-D device with John Camilo MD of [facility].  We had an extensive discussion about the pathophysiology, natural progression, and treatment of ATTR-cardiac amyloidosis.   His atrial fibrillation is persistent.  On 12/14/23, he had an echocardiogram which revealed an EF of 38%, reduced LV GLS -8.9%, moderate symmetrically increased LV wall thickness, grade II diastolic dysfunction, mildly reduced RV function, mildly increased RV wall thickness, moderately dilated LA, aortic sclerosis, mild MR, mild TR, and CRT leads.  On 04/18/24, he was admitted to [facility] with atypical chest pain and had an echocardiogram and CCTA which revealed an EF of 35-40%, grade II diastolic dysfunction, mild "LVH", aortic sclerosis, aortic sclerosis, mild AI, mild-to-moderate MR, mild TR, PASP 36 mmHg, and mild diffuse CAD.   At present, he feels well.

## 2024-06-11 NOTE — ASSESSMENT
[FreeTextEntry1] : ======================================================================================= 1. 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 Persistent atrial fibrillation: likely secondary to cardiac amyloidosis:  - continue systemic anticoagulation with Eliquis 5mg po bid  - discussed with patient avoidance of ASA and NSAIDS as well as need for bleeding surveillance  - follow up with heart rhythm specialist[facility]  3. Chronic systolic heart failure, secondary to nonischemic cardiomyopathy, cardiac amyloidosis: NYHA II:  - continue Entresto 24/26mg po bid (up titrate as SBP allows)  - continue carvedilol 6.25mg po bid (not able to tolerate 12.5mg po bid secondary to ATTR)  - continue eplerenone 25mg po qd (had mastalgia on spironolactone)  - increase SGLT2 inhibition Farxiga to 10mg po qd (discussed importance of proper urinary hygiene)  - increase furosemide to 40mg po qd prn edema  - d/w patient importance of maintaining a weight log, limiting excessive dietary sodium, and using prn loop diuretics  4. CAD: mild:   - continue off antiplatelet agents given need for systemic anticoagulation  5. 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LV GLS TTE 06/10/24....23...........................1.6.....................2,804.................................. 05/21/24.................................  1.4................................................................ 12/14/23.....23............................1.55.................5,795.........................-9.8% 01/2023..................initiated Vyndaquel.......................................................... 12/12/22............................................................................................-14.4% 06/08/22.....25....................................................2,856.................................. 12/23/21............................................................................................-14.2%  6. Polyneuropathy: hands: no indication for TTR silencer (patisiran or inotersen) at this time given wild type:  - follow up with neurologist, Nathen Mehta MD  7. CKD IV: ? secondary to amyloidosis: Cr 1.4, eGFR 50 (05/21/24):   - follow up with nephrologist, will see a new nephrologist  - check lab work today  I spent > 45 minutes of total time on this visit, including time spent face-to-face and non-face-to-face.  During this time, I took a relevant history and examined the patient.  I reviewed relevant portions of the medical record and formulated a differential diagnosis and plan.  I explained the relevant cardiac diagnoses to the patient, as well as the work up and management plan.  I answered all questions related to the patient's cardiac conditions.

## 2024-06-12 LAB
BASOPHILS # BLD AUTO: 0.07 K/UL
BASOPHILS NFR BLD AUTO: 1 %
EOSINOPHIL # BLD AUTO: 0.41 K/UL
EOSINOPHIL NFR BLD AUTO: 6 %
HCT VFR BLD CALC: 43.9 %
HGB BLD-MCNC: 14.6 G/DL
IMM GRANULOCYTES NFR BLD AUTO: 0.1 %
LYMPHOCYTES # BLD AUTO: 1.74 K/UL
LYMPHOCYTES NFR BLD AUTO: 25.4 %
MAN DIFF?: NORMAL
MCHC RBC-ENTMCNC: 33.3 GM/DL
MCHC RBC-ENTMCNC: 36.1 PG
MCV RBC AUTO: 108.7 FL
MONOCYTES # BLD AUTO: 0.85 K/UL
MONOCYTES NFR BLD AUTO: 12.4 %
NEUTROPHILS # BLD AUTO: 3.77 K/UL
NEUTROPHILS NFR BLD AUTO: 55.1 %
PLATELET # BLD AUTO: 174 K/UL
RBC # BLD: 4.04 M/UL
RBC # FLD: 14.4 %
WBC # FLD AUTO: 6.85 K/UL

## 2024-11-05 RX ORDER — TAFAMIDIS 61 MG/1
61 CAPSULE, LIQUID FILLED ORAL
Qty: 30 | Refills: 11 | Status: ACTIVE | COMMUNITY
Start: 2024-11-05 | End: 1900-01-01

## 2024-11-21 ENCOUNTER — LABORATORY RESULT (OUTPATIENT)
Age: 84
End: 2024-11-21

## 2024-11-21 ENCOUNTER — NON-APPOINTMENT (OUTPATIENT)
Age: 84
End: 2024-11-21

## 2024-11-21 ENCOUNTER — APPOINTMENT (OUTPATIENT)
Dept: HEART AND VASCULAR | Facility: CLINIC | Age: 84
End: 2024-11-21
Payer: MEDICARE

## 2024-11-21 VITALS
HEART RATE: 96 BPM | DIASTOLIC BLOOD PRESSURE: 66 MMHG | HEIGHT: 67 IN | SYSTOLIC BLOOD PRESSURE: 100 MMHG | OXYGEN SATURATION: 98 % | BODY MASS INDEX: 28.96 KG/M2 | WEIGHT: 184.5 LBS | TEMPERATURE: 97.9 F

## 2024-11-21 DIAGNOSIS — Z95.0 PRESENCE OF CARDIAC PACEMAKER: ICD-10-CM

## 2024-11-21 DIAGNOSIS — I42.8 OTHER CARDIOMYOPATHIES: ICD-10-CM

## 2024-11-21 DIAGNOSIS — N18.30 CHRONIC KIDNEY DISEASE, STAGE 3 UNSPECIFIED: ICD-10-CM

## 2024-11-21 DIAGNOSIS — I48.91 UNSPECIFIED ATRIAL FIBRILLATION: ICD-10-CM

## 2024-11-21 DIAGNOSIS — E11.9 TYPE 2 DIABETES MELLITUS W/OUT COMPLICATIONS: ICD-10-CM

## 2024-11-21 DIAGNOSIS — G63 NEUROPATHIC HEREDOFAMILIAL AMYLOIDOSIS: ICD-10-CM

## 2024-11-21 DIAGNOSIS — I45.9 CONDUCTION DISORDER, UNSPECIFIED: ICD-10-CM

## 2024-11-21 DIAGNOSIS — I50.22 CHRONIC SYSTOLIC (CONGESTIVE) HEART FAILURE: ICD-10-CM

## 2024-11-21 DIAGNOSIS — E78.5 HYPERLIPIDEMIA, UNSPECIFIED: ICD-10-CM

## 2024-11-21 DIAGNOSIS — I43 ORGAN-LIMITED AMYLOIDOSIS: ICD-10-CM

## 2024-11-21 DIAGNOSIS — E85.1 NEUROPATHIC HEREDOFAMILIAL AMYLOIDOSIS: ICD-10-CM

## 2024-11-21 DIAGNOSIS — E85.4 ORGAN-LIMITED AMYLOIDOSIS: ICD-10-CM

## 2024-11-21 PROCEDURE — 93356 MYOCRD STRAIN IMG SPCKL TRCK: CPT | Mod: 59

## 2024-11-21 PROCEDURE — 93306 TTE W/DOPPLER COMPLETE: CPT | Mod: 59

## 2024-11-21 PROCEDURE — 99215 OFFICE O/P EST HI 40 MIN: CPT | Mod: 25

## 2024-11-21 RX ORDER — EPLONTERSEN 45 MG/45MG
45 INJECTION, SOLUTION SUBCUTANEOUS
Qty: 3 | Refills: 3 | Status: ACTIVE | COMMUNITY
Start: 2024-11-21 | End: 1900-01-01

## 2024-11-22 ENCOUNTER — NON-APPOINTMENT (OUTPATIENT)
Age: 84
End: 2024-11-22

## 2024-11-22 LAB
ALBUMIN SERPL ELPH-MCNC: 4.5 G/DL
ALP BLD-CCNC: 65 U/L
ALT SERPL-CCNC: 16 U/L
ANION GAP SERPL CALC-SCNC: 16 MMOL/L
AST SERPL-CCNC: 26 U/L
BASOPHILS # BLD AUTO: 0.12 K/UL
BASOPHILS NFR BLD AUTO: 1.6 %
BILIRUB SERPL-MCNC: 0.8 MG/DL
BUN SERPL-MCNC: 50 MG/DL
CALCIUM SERPL-MCNC: 10 MG/DL
CHLORIDE SERPL-SCNC: 99 MMOL/L
CHOLEST SERPL-MCNC: 124 MG/DL
CO2 SERPL-SCNC: 28 MMOL/L
CREAT SERPL-MCNC: 1.91 MG/DL
EGFR: 35 ML/MIN/1.73M2
EOSINOPHIL # BLD AUTO: 0.35 K/UL
EOSINOPHIL NFR BLD AUTO: 4.8 %
ESTIMATED AVERAGE GLUCOSE: 137 MG/DL
GLUCOSE SERPL-MCNC: 114 MG/DL
HBA1C MFR BLD HPLC: 6.4 %
HCT VFR BLD CALC: 44.5 %
HDLC SERPL-MCNC: 73 MG/DL
HGB BLD-MCNC: 15 G/DL
LDLC SERPL DIRECT ASSAY-MCNC: 35 MG/DL
LYMPHOCYTES # BLD AUTO: 2.02 K/UL
LYMPHOCYTES NFR BLD AUTO: 28 %
MAN DIFF?: NORMAL
MCHC RBC-ENTMCNC: 33.7 G/DL
MCHC RBC-ENTMCNC: 36.8 PG
MCV RBC AUTO: 109.1 FL
MONOCYTES # BLD AUTO: 1.21 K/UL
MONOCYTES NFR BLD AUTO: 16.8 %
NEUTROPHILS # BLD AUTO: 3.52 K/UL
NEUTROPHILS NFR BLD AUTO: 48.8 %
NT-PROBNP SERPL-MCNC: 3130 PG/ML
PLATELET # BLD AUTO: 167 K/UL
POTASSIUM SERPL-SCNC: 5.1 MMOL/L
PREALB SERPL NEPH-MCNC: 29 MG/DL
PROT SERPL-MCNC: 8 G/DL
RBC # BLD: 4.08 M/UL
RBC # FLD: 12.3 %
SODIUM SERPL-SCNC: 142 MMOL/L
TRIGL SERPL-MCNC: 73 MG/DL
TSH SERPL-ACNC: 3.14 UIU/ML
WBC # FLD AUTO: 7.22 K/UL

## 2024-12-10 ENCOUNTER — OUTPATIENT (OUTPATIENT)
Dept: OUTPATIENT SERVICES | Facility: HOSPITAL | Age: 84
LOS: 1 days | End: 2024-12-10

## 2024-12-10 ENCOUNTER — APPOINTMENT (OUTPATIENT)
Dept: INTERNAL MEDICINE | Facility: CLINIC | Age: 84
End: 2024-12-10

## 2024-12-10 DIAGNOSIS — Z90.49 ACQUIRED ABSENCE OF OTHER SPECIFIED PARTS OF DIGESTIVE TRACT: Chronic | ICD-10-CM

## 2025-04-14 ENCOUNTER — LABORATORY RESULT (OUTPATIENT)
Age: 85
End: 2025-04-14

## 2025-04-14 ENCOUNTER — NON-APPOINTMENT (OUTPATIENT)
Age: 85
End: 2025-04-14

## 2025-04-14 ENCOUNTER — APPOINTMENT (OUTPATIENT)
Dept: HEART AND VASCULAR | Facility: CLINIC | Age: 85
End: 2025-04-14
Payer: MEDICARE

## 2025-04-14 VITALS
TEMPERATURE: 97.6 F | OXYGEN SATURATION: 99 % | HEART RATE: 72 BPM | SYSTOLIC BLOOD PRESSURE: 123 MMHG | DIASTOLIC BLOOD PRESSURE: 72 MMHG | BODY MASS INDEX: 28.95 KG/M2 | HEIGHT: 67 IN | WEIGHT: 184.44 LBS

## 2025-04-14 DIAGNOSIS — E85.4 ORGAN-LIMITED AMYLOIDOSIS: ICD-10-CM

## 2025-04-14 DIAGNOSIS — E11.9 TYPE 2 DIABETES MELLITUS W/OUT COMPLICATIONS: ICD-10-CM

## 2025-04-14 DIAGNOSIS — N18.30 CHRONIC KIDNEY DISEASE, STAGE 3 UNSPECIFIED: ICD-10-CM

## 2025-04-14 DIAGNOSIS — I48.91 UNSPECIFIED ATRIAL FIBRILLATION: ICD-10-CM

## 2025-04-14 DIAGNOSIS — I50.22 CHRONIC SYSTOLIC (CONGESTIVE) HEART FAILURE: ICD-10-CM

## 2025-04-14 DIAGNOSIS — I43 ORGAN-LIMITED AMYLOIDOSIS: ICD-10-CM

## 2025-04-14 DIAGNOSIS — E85.1 NEUROPATHIC HEREDOFAMILIAL AMYLOIDOSIS: ICD-10-CM

## 2025-04-14 DIAGNOSIS — I42.8 OTHER CARDIOMYOPATHIES: ICD-10-CM

## 2025-04-14 DIAGNOSIS — G63 NEUROPATHIC HEREDOFAMILIAL AMYLOIDOSIS: ICD-10-CM

## 2025-04-14 DIAGNOSIS — E78.5 HYPERLIPIDEMIA, UNSPECIFIED: ICD-10-CM

## 2025-04-14 DIAGNOSIS — Z95.0 PRESENCE OF CARDIAC PACEMAKER: ICD-10-CM

## 2025-04-14 DIAGNOSIS — I45.9 CONDUCTION DISORDER, UNSPECIFIED: ICD-10-CM

## 2025-04-14 PROCEDURE — 99215 OFFICE O/P EST HI 40 MIN: CPT

## 2025-04-14 PROCEDURE — G2211 COMPLEX E/M VISIT ADD ON: CPT

## 2025-04-14 PROCEDURE — 93000 ELECTROCARDIOGRAM COMPLETE: CPT

## 2025-04-15 ENCOUNTER — NON-APPOINTMENT (OUTPATIENT)
Age: 85
End: 2025-04-15

## 2025-04-15 LAB
ALBUMIN SERPL ELPH-MCNC: 4.3 G/DL
ALP BLD-CCNC: 107 U/L
ALT SERPL-CCNC: 15 U/L
ANION GAP SERPL CALC-SCNC: 16 MMOL/L
AST SERPL-CCNC: 29 U/L
BASOPHILS # BLD AUTO: 0.05 K/UL
BASOPHILS NFR BLD AUTO: 0.8 %
BILIRUB SERPL-MCNC: 1 MG/DL
BUN SERPL-MCNC: 46 MG/DL
CALCIUM SERPL-MCNC: 10.1 MG/DL
CHLORIDE SERPL-SCNC: 100 MMOL/L
CHOLEST SERPL-MCNC: 92 MG/DL
CO2 SERPL-SCNC: 25 MMOL/L
CREAT SERPL-MCNC: 1.46 MG/DL
EGFRCR SERPLBLD CKD-EPI 2021: 47 ML/MIN/1.73M2
EOSINOPHIL # BLD AUTO: 0.27 K/UL
EOSINOPHIL NFR BLD AUTO: 4.1 %
ESTIMATED AVERAGE GLUCOSE: 154 MG/DL
GLUCOSE SERPL-MCNC: 126 MG/DL
HBA1C MFR BLD HPLC: 7 %
HCT VFR BLD CALC: 41.2 %
HDLC SERPL-MCNC: 57 MG/DL
HGB BLD-MCNC: 13.8 G/DL
IMM GRANULOCYTES NFR BLD AUTO: 0.3 %
LDLC SERPL DIRECT ASSAY-MCNC: 23 MG/DL
LYMPHOCYTES # BLD AUTO: 1.35 K/UL
LYMPHOCYTES NFR BLD AUTO: 20.5 %
MAN DIFF?: NORMAL
MCHC RBC-ENTMCNC: 33.5 G/DL
MCHC RBC-ENTMCNC: 36 PG
MCV RBC AUTO: 107.6 FL
MONOCYTES # BLD AUTO: 0.86 K/UL
MONOCYTES NFR BLD AUTO: 13.1 %
NEUTROPHILS # BLD AUTO: 4.04 K/UL
NEUTROPHILS NFR BLD AUTO: 61.2 %
NT-PROBNP SERPL-MCNC: 5171 PG/ML
PLATELET # BLD AUTO: 141 K/UL
POTASSIUM SERPL-SCNC: 5 MMOL/L
PREALB SERPL NEPH-MCNC: 14 MG/DL
PROT SERPL-MCNC: 7.8 G/DL
RBC # BLD: 3.83 M/UL
RBC # FLD: 13.4 %
SODIUM SERPL-SCNC: 140 MMOL/L
TRIGL SERPL-MCNC: 57 MG/DL
TSH SERPL-ACNC: 2.94 UIU/ML
WBC # FLD AUTO: 6.59 K/UL

## 2025-06-09 ENCOUNTER — OUTPATIENT (OUTPATIENT)
Dept: OUTPATIENT SERVICES | Facility: HOSPITAL | Age: 85
LOS: 1 days | End: 2025-06-09

## 2025-06-09 ENCOUNTER — APPOINTMENT (OUTPATIENT)
Dept: INTERNAL MEDICINE | Facility: CLINIC | Age: 85
End: 2025-06-09

## 2025-06-09 DIAGNOSIS — Z95.0 PRESENCE OF CARDIAC PACEMAKER: Chronic | ICD-10-CM

## 2025-06-09 DIAGNOSIS — Z90.49 ACQUIRED ABSENCE OF OTHER SPECIFIED PARTS OF DIGESTIVE TRACT: Chronic | ICD-10-CM

## 2025-06-09 DIAGNOSIS — Z98.890 OTHER SPECIFIED POSTPROCEDURAL STATES: Chronic | ICD-10-CM

## 2025-06-10 ENCOUNTER — RX RENEWAL (OUTPATIENT)
Age: 85
End: 2025-06-10

## 2025-07-10 ENCOUNTER — RX RENEWAL (OUTPATIENT)
Age: 85
End: 2025-07-10

## 2025-07-29 ENCOUNTER — NON-APPOINTMENT (OUTPATIENT)
Age: 85
End: 2025-07-29

## 2025-07-31 ENCOUNTER — APPOINTMENT (OUTPATIENT)
Dept: HEART AND VASCULAR | Facility: CLINIC | Age: 85
End: 2025-07-31
Payer: MEDICARE

## 2025-07-31 VITALS
OXYGEN SATURATION: 97 % | HEIGHT: 67 IN | DIASTOLIC BLOOD PRESSURE: 80 MMHG | WEIGHT: 177.5 LBS | HEART RATE: 104 BPM | SYSTOLIC BLOOD PRESSURE: 120 MMHG | BODY MASS INDEX: 27.86 KG/M2 | TEMPERATURE: 97.7 F | RESPIRATION RATE: 17 BRPM

## 2025-07-31 DIAGNOSIS — I50.22 CHRONIC SYSTOLIC (CONGESTIVE) HEART FAILURE: ICD-10-CM

## 2025-07-31 DIAGNOSIS — E78.5 HYPERLIPIDEMIA, UNSPECIFIED: ICD-10-CM

## 2025-07-31 DIAGNOSIS — I48.91 UNSPECIFIED ATRIAL FIBRILLATION: ICD-10-CM

## 2025-07-31 DIAGNOSIS — G63 NEUROPATHIC HEREDOFAMILIAL AMYLOIDOSIS: ICD-10-CM

## 2025-07-31 DIAGNOSIS — I45.9 CONDUCTION DISORDER, UNSPECIFIED: ICD-10-CM

## 2025-07-31 DIAGNOSIS — E85.1 NEUROPATHIC HEREDOFAMILIAL AMYLOIDOSIS: ICD-10-CM

## 2025-07-31 DIAGNOSIS — I43 ORGAN-LIMITED AMYLOIDOSIS: ICD-10-CM

## 2025-07-31 DIAGNOSIS — Z95.0 PRESENCE OF CARDIAC PACEMAKER: ICD-10-CM

## 2025-07-31 DIAGNOSIS — N18.30 CHRONIC KIDNEY DISEASE, STAGE 3 UNSPECIFIED: ICD-10-CM

## 2025-07-31 DIAGNOSIS — E85.4 ORGAN-LIMITED AMYLOIDOSIS: ICD-10-CM

## 2025-07-31 PROCEDURE — 99215 OFFICE O/P EST HI 40 MIN: CPT

## 2025-07-31 PROCEDURE — G2211 COMPLEX E/M VISIT ADD ON: CPT

## 2025-09-04 ENCOUNTER — RX RENEWAL (OUTPATIENT)
Age: 85
End: 2025-09-04